# Patient Record
Sex: MALE | Race: WHITE | HISPANIC OR LATINO
[De-identification: names, ages, dates, MRNs, and addresses within clinical notes are randomized per-mention and may not be internally consistent; named-entity substitution may affect disease eponyms.]

---

## 2019-03-05 PROBLEM — Z00.00 ENCOUNTER FOR PREVENTIVE HEALTH EXAMINATION: Status: ACTIVE | Noted: 2019-03-05

## 2019-03-12 ENCOUNTER — APPOINTMENT (OUTPATIENT)
Dept: CARDIOLOGY | Facility: CLINIC | Age: 49
End: 2019-03-12

## 2019-03-12 VITALS
HEIGHT: 63 IN | WEIGHT: 137 LBS | OXYGEN SATURATION: 98 % | HEART RATE: 68 BPM | DIASTOLIC BLOOD PRESSURE: 62 MMHG | BODY MASS INDEX: 24.27 KG/M2 | SYSTOLIC BLOOD PRESSURE: 108 MMHG

## 2019-03-12 VITALS — SYSTOLIC BLOOD PRESSURE: 104 MMHG | DIASTOLIC BLOOD PRESSURE: 64 MMHG

## 2019-04-08 ENCOUNTER — APPOINTMENT (OUTPATIENT)
Dept: CARDIOLOGY | Facility: CLINIC | Age: 49
End: 2019-04-08
Payer: COMMERCIAL

## 2019-04-08 ENCOUNTER — NON-APPOINTMENT (OUTPATIENT)
Age: 49
End: 2019-04-08

## 2019-04-08 VITALS
WEIGHT: 140 LBS | DIASTOLIC BLOOD PRESSURE: 70 MMHG | OXYGEN SATURATION: 97 % | HEART RATE: 65 BPM | HEIGHT: 60 IN | BODY MASS INDEX: 27.48 KG/M2 | SYSTOLIC BLOOD PRESSURE: 112 MMHG

## 2019-04-08 VITALS — DIASTOLIC BLOOD PRESSURE: 66 MMHG | BODY MASS INDEX: 26.45 KG/M2 | HEIGHT: 61 IN | SYSTOLIC BLOOD PRESSURE: 108 MMHG

## 2019-04-08 DIAGNOSIS — E05.90 THYROTOXICOSIS, UNSPECIFIED W/OUT THYROTOXIC CRISIS OR STORM: ICD-10-CM

## 2019-04-08 DIAGNOSIS — E78.5 HYPERLIPIDEMIA, UNSPECIFIED: ICD-10-CM

## 2019-04-08 DIAGNOSIS — Z78.9 OTHER SPECIFIED HEALTH STATUS: ICD-10-CM

## 2019-04-08 DIAGNOSIS — I60.9 NONTRAUMATIC SUBARACHNOID HEMORRHAGE, UNSPECIFIED: ICD-10-CM

## 2019-04-08 PROCEDURE — 93000 ELECTROCARDIOGRAM COMPLETE: CPT

## 2019-04-08 PROCEDURE — 99203 OFFICE O/P NEW LOW 30 MIN: CPT

## 2019-04-08 RX ORDER — ATORVASTATIN CALCIUM 10 MG/1
10 TABLET, FILM COATED ORAL
Refills: 0 | Status: ACTIVE | COMMUNITY

## 2019-04-08 RX ORDER — LEVOTHYROXINE SODIUM 75 UG/1
75 TABLET ORAL
Refills: 0 | Status: ACTIVE | COMMUNITY

## 2019-04-08 RX ORDER — CHOLECALCIFEROL (VITAMIN D3) 125 MCG
5000 CAPSULE ORAL
Refills: 0 | Status: ACTIVE | COMMUNITY

## 2019-05-06 ENCOUNTER — OUTPATIENT (OUTPATIENT)
Dept: OUTPATIENT SERVICES | Facility: HOSPITAL | Age: 49
LOS: 1 days | End: 2019-05-06
Payer: COMMERCIAL

## 2019-05-06 PROCEDURE — 33286 RMVL SUBQ CAR RHYTHM MNTR: CPT

## 2019-05-14 ENCOUNTER — APPOINTMENT (OUTPATIENT)
Dept: CARDIOLOGY | Facility: CLINIC | Age: 49
End: 2019-05-14

## 2021-11-26 ENCOUNTER — EMERGENCY (EMERGENCY)
Facility: HOSPITAL | Age: 51
LOS: 1 days | End: 2021-11-26
Admitting: EMERGENCY MEDICINE
Payer: COMMERCIAL

## 2021-11-26 ENCOUNTER — INPATIENT (INPATIENT)
Facility: HOSPITAL | Age: 51
LOS: 6 days | Discharge: HOME | End: 2021-12-03
Attending: INTERNAL MEDICINE | Admitting: INTERNAL MEDICINE
Payer: COMMERCIAL

## 2021-11-26 VITALS
RESPIRATION RATE: 17 BRPM | OXYGEN SATURATION: 100 % | SYSTOLIC BLOOD PRESSURE: 132 MMHG | TEMPERATURE: 99 F | DIASTOLIC BLOOD PRESSURE: 82 MMHG | HEART RATE: 66 BPM

## 2021-11-26 PROCEDURE — 70487 CT MAXILLOFACIAL W/DYE: CPT | Mod: 26

## 2021-11-26 PROCEDURE — 99284 EMERGENCY DEPT VISIT MOD MDM: CPT

## 2021-11-26 PROCEDURE — 99285 EMERGENCY DEPT VISIT HI MDM: CPT

## 2021-11-26 NOTE — ED PROVIDER NOTE - ATTENDING CONTRIBUTION TO CARE
I performed a face-to-face evaluation of the patient and performed a history and physical examination. I agree with the history and physical examination.    Musa: Transfer from [facility] for left anterior mandibular collection to see OMFS or dental. No fevers. This has progressed over about two months and has failed amoxicillin. Will call OMFS. May need to repeat CT scan because we are unable to visualize prior imaging from today. I performed a face-to-face evaluation of the patient and performed a history and physical examination. I agree with the history and physical examination.    Musa: Transfer from [facility] for left anterior mandibular collection to see OMFS or dental. No fevers. This has progressed over about two months and has failed amoxicillin. Unclear if this is an abscess, since it's not red/hot/particularly tender. Will call OMFS. May need to repeat CT scan because we are unable to visualize prior imaging from today.

## 2021-11-26 NOTE — ED ADULT TRIAGE NOTE - CHIEF COMPLAINT QUOTE
pt presents to ED as a transfer from [facility] ER for OMFS consult secondary to left mandibular swelling x 2 weeks ago that is progressively worsening. pt arrives alert in nad, 20g IV to L AC. Given given clindamycin IVPB at sending facility.

## 2021-11-26 NOTE — ED PROVIDER NOTE - PHYSICAL EXAMINATION
HEENT: L sided extraoral swelling. No tongue elevation or trismus. Uvula is midline and rises symmetrically with phonation.

## 2021-11-26 NOTE — ED PROVIDER NOTE - CLINICAL SUMMARY MEDICAL DECISION MAKING FREE TEXT BOX
Musa: Transfer from [facility] for left anterior mandibular collection to see OMFS or dental. No fevers. This has progressed over about two months and has failed amoxicillin. Will call OMFS. May need to repeat CT scan because we are unable to visualize prior imaging from today. Musa: Transfer from [facility] for left anterior mandibular collection to see OMFS or dental. No fevers. This has progressed over about two months and has failed amoxicillin. Unclear if this is an abscess, since it's not red/hot/particularly tender. Will call OMFS. May need to repeat CT scan because we are unable to visualize prior imaging from today.

## 2021-11-26 NOTE — ED PROVIDER NOTE - CARE PLAN
Detail Level: Detailed Depth Of Biopsy: dermis Principal Discharge DX:	Dental abscess   Was A Bandage Applied: Yes Size Of Lesion In Cm: 0 Biopsy Type: H and E 1 Biopsy Method: Dermablade Anesthesia Type: 1% lidocaine with epinephrine Anesthesia Volume In Cc (Will Not Render If 0): 0.5 Hemostasis: Aluminum Chloride and Electrocautery Wound Care: Vaseline Dressing: bandage Destruction After The Procedure: No Type Of Destruction Used: Curettage Lab: 6 Lab Facility: 3 Billing Type: Third-Party Bill Information: Selecting Yes will display possible errors in your note based on the variables you have selected. This validation is only offered as a suggestion for you. PLEASE NOTE THAT THE VALIDATION TEXT WILL BE REMOVED WHEN YOU FINALIZE YOUR NOTE. IF YOU WANT TO FAX A PRELIMINARY NOTE YOU WILL NEED TO TOGGLE THIS TO 'NO' IF YOU DO NOT WANT IT IN YOUR FAXED NOTE. Principal Discharge DX:	Mandibular swelling

## 2021-11-26 NOTE — ED PROVIDER NOTE - OBJECTIVE STATEMENT
52 Y/O M PMH Hypothyroidism, Hyperlipidemia states that he has been having L sided extraoral pain for the past 2 months. Pt states he went to [facility]. Pt states he did not have any intervention performed there. Pt states he was given Amoxicillin by his PMD which he has been taking without improvement. Pt denies any other sx or acute complaints. [facility], was given Clindamycin and had a CT performed, pt was transferred to [facility] for OMFS eval. 50 Y/O M PMH Hypothyroidism, Hyperlipidemia states that he has been having L sided extraoral pain for the past 2 months near a site at the anterior mandible where he had a remote implant. Pt states he went to [facility]. Pt states he did not have any intervention performed there. Pt states he was given Amoxicillin by his PMD which he has been taking without improvement. Pt denies any other sx or acute complaints. Pt was seen at [facility], was given Clindamycin and had a CT performed, pt was transferred to [facility] for OMFS eval.

## 2021-11-26 NOTE — ED PROVIDER NOTE - PROGRESS NOTE DETAILS
NEGRO Liriano: Discussed with OMFS, requests to discuss case with dental first, discussed case with dental, awaiting eval. Musa: Seen by Dental. CDU for MRI, as this is not clearly an abscess. May be a tumor.

## 2021-11-27 DIAGNOSIS — E78.5 HYPERLIPIDEMIA, UNSPECIFIED: ICD-10-CM

## 2021-11-27 DIAGNOSIS — L81.9 DISORDER OF PIGMENTATION, UNSPECIFIED: ICD-10-CM

## 2021-11-27 DIAGNOSIS — Z29.9 ENCOUNTER FOR PROPHYLACTIC MEASURES, UNSPECIFIED: ICD-10-CM

## 2021-11-27 DIAGNOSIS — R22.0 LOCALIZED SWELLING, MASS AND LUMP, HEAD: ICD-10-CM

## 2021-11-27 DIAGNOSIS — E03.9 HYPOTHYROIDISM, UNSPECIFIED: ICD-10-CM

## 2021-11-27 LAB
A1C WITH ESTIMATED AVERAGE GLUCOSE RESULT: 5.6 % — SIGNIFICANT CHANGE UP (ref 4–5.6)
A1C WITH ESTIMATED AVERAGE GLUCOSE RESULT: 5.7 % — HIGH (ref 4–5.6)
ALBUMIN SERPL ELPH-MCNC: 4.2 G/DL — SIGNIFICANT CHANGE UP (ref 3.3–5)
ALBUMIN SERPL ELPH-MCNC: 4.3 G/DL — SIGNIFICANT CHANGE UP (ref 3.3–5)
ALP SERPL-CCNC: 105 U/L — SIGNIFICANT CHANGE UP (ref 40–120)
ALP SERPL-CCNC: 107 U/L — SIGNIFICANT CHANGE UP (ref 40–120)
ALT FLD-CCNC: 20 U/L — SIGNIFICANT CHANGE UP (ref 4–41)
ALT FLD-CCNC: 22 U/L — SIGNIFICANT CHANGE UP (ref 4–41)
ANION GAP SERPL CALC-SCNC: 10 MMOL/L — SIGNIFICANT CHANGE UP (ref 7–14)
ANION GAP SERPL CALC-SCNC: 12 MMOL/L — SIGNIFICANT CHANGE UP (ref 7–14)
AST SERPL-CCNC: 18 U/L — SIGNIFICANT CHANGE UP (ref 4–40)
AST SERPL-CCNC: 20 U/L — SIGNIFICANT CHANGE UP (ref 4–40)
B PERT DNA SPEC QL NAA+PROBE: SIGNIFICANT CHANGE UP
B PERT+PARAPERT DNA PNL SPEC NAA+PROBE: SIGNIFICANT CHANGE UP
BASOPHILS # BLD AUTO: 0.02 K/UL — SIGNIFICANT CHANGE UP (ref 0–0.2)
BASOPHILS NFR BLD AUTO: 0.3 % — SIGNIFICANT CHANGE UP (ref 0–2)
BILIRUB SERPL-MCNC: 0.8 MG/DL — SIGNIFICANT CHANGE UP (ref 0.2–1.2)
BILIRUB SERPL-MCNC: 0.8 MG/DL — SIGNIFICANT CHANGE UP (ref 0.2–1.2)
BORDETELLA PARAPERTUSSIS (RAPRVP): SIGNIFICANT CHANGE UP
BUN SERPL-MCNC: 18 MG/DL — SIGNIFICANT CHANGE UP (ref 7–23)
BUN SERPL-MCNC: 18 MG/DL — SIGNIFICANT CHANGE UP (ref 7–23)
C PNEUM DNA SPEC QL NAA+PROBE: SIGNIFICANT CHANGE UP
CALCIUM SERPL-MCNC: 9 MG/DL — SIGNIFICANT CHANGE UP (ref 8.4–10.5)
CALCIUM SERPL-MCNC: 9.3 MG/DL — SIGNIFICANT CHANGE UP (ref 8.4–10.5)
CHLORIDE SERPL-SCNC: 101 MMOL/L — SIGNIFICANT CHANGE UP (ref 98–107)
CHLORIDE SERPL-SCNC: 103 MMOL/L — SIGNIFICANT CHANGE UP (ref 98–107)
CO2 SERPL-SCNC: 24 MMOL/L — SIGNIFICANT CHANGE UP (ref 22–31)
CO2 SERPL-SCNC: 26 MMOL/L — SIGNIFICANT CHANGE UP (ref 22–31)
CREAT SERPL-MCNC: 0.86 MG/DL — SIGNIFICANT CHANGE UP (ref 0.5–1.3)
CREAT SERPL-MCNC: 0.88 MG/DL — SIGNIFICANT CHANGE UP (ref 0.5–1.3)
EOSINOPHIL # BLD AUTO: 0.12 K/UL — SIGNIFICANT CHANGE UP (ref 0–0.5)
EOSINOPHIL NFR BLD AUTO: 1.8 % — SIGNIFICANT CHANGE UP (ref 0–6)
ESTIMATED AVERAGE GLUCOSE: 114 — SIGNIFICANT CHANGE UP
ESTIMATED AVERAGE GLUCOSE: 117 — SIGNIFICANT CHANGE UP
FLUAV SUBTYP SPEC NAA+PROBE: SIGNIFICANT CHANGE UP
FLUBV RNA SPEC QL NAA+PROBE: SIGNIFICANT CHANGE UP
GLUCOSE SERPL-MCNC: 100 MG/DL — HIGH (ref 70–99)
GLUCOSE SERPL-MCNC: 93 MG/DL — SIGNIFICANT CHANGE UP (ref 70–99)
HADV DNA SPEC QL NAA+PROBE: SIGNIFICANT CHANGE UP
HCOV 229E RNA SPEC QL NAA+PROBE: SIGNIFICANT CHANGE UP
HCOV HKU1 RNA SPEC QL NAA+PROBE: SIGNIFICANT CHANGE UP
HCOV NL63 RNA SPEC QL NAA+PROBE: SIGNIFICANT CHANGE UP
HCOV OC43 RNA SPEC QL NAA+PROBE: SIGNIFICANT CHANGE UP
HCT VFR BLD CALC: 42.8 % — SIGNIFICANT CHANGE UP (ref 39–50)
HCT VFR BLD CALC: 43.5 % — SIGNIFICANT CHANGE UP (ref 39–50)
HGB BLD-MCNC: 14.5 G/DL — SIGNIFICANT CHANGE UP (ref 13–17)
HGB BLD-MCNC: 14.5 G/DL — SIGNIFICANT CHANGE UP (ref 13–17)
HMPV RNA SPEC QL NAA+PROBE: SIGNIFICANT CHANGE UP
HPIV1 RNA SPEC QL NAA+PROBE: SIGNIFICANT CHANGE UP
HPIV2 RNA SPEC QL NAA+PROBE: SIGNIFICANT CHANGE UP
HPIV3 RNA SPEC QL NAA+PROBE: SIGNIFICANT CHANGE UP
HPIV4 RNA SPEC QL NAA+PROBE: SIGNIFICANT CHANGE UP
IANC: 4.32 K/UL — SIGNIFICANT CHANGE UP (ref 1.5–8.5)
IMM GRANULOCYTES NFR BLD AUTO: 0.2 % — SIGNIFICANT CHANGE UP (ref 0–1.5)
LYMPHOCYTES # BLD AUTO: 1.5 K/UL — SIGNIFICANT CHANGE UP (ref 1–3.3)
LYMPHOCYTES # BLD AUTO: 22.7 % — SIGNIFICANT CHANGE UP (ref 13–44)
M PNEUMO DNA SPEC QL NAA+PROBE: SIGNIFICANT CHANGE UP
MCHC RBC-ENTMCNC: 30.1 PG — SIGNIFICANT CHANGE UP (ref 27–34)
MCHC RBC-ENTMCNC: 30.8 PG — SIGNIFICANT CHANGE UP (ref 27–34)
MCHC RBC-ENTMCNC: 33.3 GM/DL — SIGNIFICANT CHANGE UP (ref 32–36)
MCHC RBC-ENTMCNC: 33.9 GM/DL — SIGNIFICANT CHANGE UP (ref 32–36)
MCV RBC AUTO: 90.4 FL — SIGNIFICANT CHANGE UP (ref 80–100)
MCV RBC AUTO: 90.9 FL — SIGNIFICANT CHANGE UP (ref 80–100)
MONOCYTES # BLD AUTO: 0.63 K/UL — SIGNIFICANT CHANGE UP (ref 0–0.9)
MONOCYTES NFR BLD AUTO: 9.5 % — SIGNIFICANT CHANGE UP (ref 2–14)
NEUTROPHILS # BLD AUTO: 4.32 K/UL — SIGNIFICANT CHANGE UP (ref 1.8–7.4)
NEUTROPHILS NFR BLD AUTO: 65.5 % — SIGNIFICANT CHANGE UP (ref 43–77)
NIGHT BLUE STAIN TISS: SIGNIFICANT CHANGE UP
NRBC # BLD: 0 /100 WBCS — SIGNIFICANT CHANGE UP
NRBC # BLD: 0 /100 WBCS — SIGNIFICANT CHANGE UP
NRBC # FLD: 0 K/UL — SIGNIFICANT CHANGE UP
NRBC # FLD: 0 K/UL — SIGNIFICANT CHANGE UP
PLATELET # BLD AUTO: 210 K/UL — SIGNIFICANT CHANGE UP (ref 150–400)
PLATELET # BLD AUTO: 224 K/UL — SIGNIFICANT CHANGE UP (ref 150–400)
POTASSIUM SERPL-MCNC: 3.9 MMOL/L — SIGNIFICANT CHANGE UP (ref 3.5–5.3)
POTASSIUM SERPL-MCNC: 4.1 MMOL/L — SIGNIFICANT CHANGE UP (ref 3.5–5.3)
POTASSIUM SERPL-SCNC: 3.9 MMOL/L — SIGNIFICANT CHANGE UP (ref 3.5–5.3)
POTASSIUM SERPL-SCNC: 4.1 MMOL/L — SIGNIFICANT CHANGE UP (ref 3.5–5.3)
PROT SERPL-MCNC: 6.8 G/DL — SIGNIFICANT CHANGE UP (ref 6–8.3)
PROT SERPL-MCNC: 7.1 G/DL — SIGNIFICANT CHANGE UP (ref 6–8.3)
RAPID RVP RESULT: SIGNIFICANT CHANGE UP
RBC # BLD: 4.71 M/UL — SIGNIFICANT CHANGE UP (ref 4.2–5.8)
RBC # BLD: 4.81 M/UL — SIGNIFICANT CHANGE UP (ref 4.2–5.8)
RBC # FLD: 12.1 % — SIGNIFICANT CHANGE UP (ref 10.3–14.5)
RBC # FLD: 12.3 % — SIGNIFICANT CHANGE UP (ref 10.3–14.5)
RSV RNA SPEC QL NAA+PROBE: SIGNIFICANT CHANGE UP
RV+EV RNA SPEC QL NAA+PROBE: SIGNIFICANT CHANGE UP
SARS-COV-2 RNA SPEC QL NAA+PROBE: SIGNIFICANT CHANGE UP
SODIUM SERPL-SCNC: 137 MMOL/L — SIGNIFICANT CHANGE UP (ref 135–145)
SODIUM SERPL-SCNC: 139 MMOL/L — SIGNIFICANT CHANGE UP (ref 135–145)
SPECIMEN SOURCE: SIGNIFICANT CHANGE UP
WBC # BLD: 6.6 K/UL — SIGNIFICANT CHANGE UP (ref 3.8–10.5)
WBC # BLD: 6.65 K/UL — SIGNIFICANT CHANGE UP (ref 3.8–10.5)
WBC # FLD AUTO: 6.6 K/UL — SIGNIFICANT CHANGE UP (ref 3.8–10.5)
WBC # FLD AUTO: 6.65 K/UL — SIGNIFICANT CHANGE UP (ref 3.8–10.5)

## 2021-11-27 PROCEDURE — 99236 HOSP IP/OBS SAME DATE HI 85: CPT

## 2021-11-27 PROCEDURE — 99223 1ST HOSP IP/OBS HIGH 75: CPT | Mod: GC

## 2021-11-27 RX ORDER — AMPICILLIN SODIUM AND SULBACTAM SODIUM 250; 125 MG/ML; MG/ML
3 INJECTION, POWDER, FOR SUSPENSION INTRAMUSCULAR; INTRAVENOUS EVERY 6 HOURS
Refills: 0 | Status: DISCONTINUED | OUTPATIENT
Start: 2021-11-27 | End: 2021-12-03

## 2021-11-27 RX ORDER — SODIUM CHLORIDE 9 MG/ML
1000 INJECTION INTRAMUSCULAR; INTRAVENOUS; SUBCUTANEOUS
Refills: 0 | Status: DISCONTINUED | OUTPATIENT
Start: 2021-11-27 | End: 2021-11-27

## 2021-11-27 RX ORDER — ACETAMINOPHEN 500 MG
975 TABLET ORAL EVERY 6 HOURS
Refills: 0 | Status: DISCONTINUED | OUTPATIENT
Start: 2021-11-27 | End: 2021-11-30

## 2021-11-27 RX ORDER — VANCOMYCIN HCL 1 G
1000 VIAL (EA) INTRAVENOUS ONCE
Refills: 0 | Status: COMPLETED | OUTPATIENT
Start: 2021-11-27 | End: 2021-11-27

## 2021-11-27 RX ORDER — LEVOTHYROXINE SODIUM 125 MCG
1 TABLET ORAL
Qty: 0 | Refills: 0 | DISCHARGE

## 2021-11-27 RX ORDER — VANCOMYCIN HCL 1 G
750 VIAL (EA) INTRAVENOUS EVERY 12 HOURS
Refills: 0 | Status: DISCONTINUED | OUTPATIENT
Start: 2021-11-27 | End: 2021-11-29

## 2021-11-27 RX ORDER — AMPICILLIN SODIUM AND SULBACTAM SODIUM 250; 125 MG/ML; MG/ML
3 INJECTION, POWDER, FOR SUSPENSION INTRAMUSCULAR; INTRAVENOUS ONCE
Refills: 0 | Status: COMPLETED | OUTPATIENT
Start: 2021-11-27 | End: 2021-11-27

## 2021-11-27 RX ORDER — ATORVASTATIN CALCIUM 80 MG/1
1 TABLET, FILM COATED ORAL
Qty: 0 | Refills: 0 | DISCHARGE

## 2021-11-27 RX ORDER — ATORVASTATIN CALCIUM 80 MG/1
10 TABLET, FILM COATED ORAL AT BEDTIME
Refills: 0 | Status: DISCONTINUED | OUTPATIENT
Start: 2021-11-27 | End: 2021-12-03

## 2021-11-27 RX ORDER — LEVOTHYROXINE SODIUM 125 MCG
75 TABLET ORAL DAILY
Refills: 0 | Status: DISCONTINUED | OUTPATIENT
Start: 2021-11-27 | End: 2021-12-03

## 2021-11-27 RX ORDER — KETOROLAC TROMETHAMINE 30 MG/ML
30 SYRINGE (ML) INJECTION EVERY 6 HOURS
Refills: 0 | Status: DISCONTINUED | OUTPATIENT
Start: 2021-11-27 | End: 2021-11-27

## 2021-11-27 RX ADMIN — Medication 975 MILLIGRAM(S): at 23:15

## 2021-11-27 RX ADMIN — Medication 975 MILLIGRAM(S): at 22:24

## 2021-11-27 RX ADMIN — AMPICILLIN SODIUM AND SULBACTAM SODIUM 200 GRAM(S): 250; 125 INJECTION, POWDER, FOR SUSPENSION INTRAMUSCULAR; INTRAVENOUS at 14:01

## 2021-11-27 RX ADMIN — Medication 250 MILLIGRAM(S): at 17:25

## 2021-11-27 RX ADMIN — AMPICILLIN SODIUM AND SULBACTAM SODIUM 200 GRAM(S): 250; 125 INJECTION, POWDER, FOR SUSPENSION INTRAMUSCULAR; INTRAVENOUS at 20:08

## 2021-11-27 RX ADMIN — SODIUM CHLORIDE 125 MILLILITER(S): 9 INJECTION INTRAMUSCULAR; INTRAVENOUS; SUBCUTANEOUS at 20:09

## 2021-11-27 RX ADMIN — AMPICILLIN SODIUM AND SULBACTAM SODIUM 200 GRAM(S): 250; 125 INJECTION, POWDER, FOR SUSPENSION INTRAMUSCULAR; INTRAVENOUS at 02:33

## 2021-11-27 RX ADMIN — SODIUM CHLORIDE 125 MILLILITER(S): 9 INJECTION INTRAMUSCULAR; INTRAVENOUS; SUBCUTANEOUS at 04:43

## 2021-11-27 RX ADMIN — AMPICILLIN SODIUM AND SULBACTAM SODIUM 200 GRAM(S): 250; 125 INJECTION, POWDER, FOR SUSPENSION INTRAMUSCULAR; INTRAVENOUS at 07:48

## 2021-11-27 RX ADMIN — ATORVASTATIN CALCIUM 10 MILLIGRAM(S): 80 TABLET, FILM COATED ORAL at 22:25

## 2021-11-27 RX ADMIN — Medication 30 MILLIGRAM(S): at 04:39

## 2021-11-27 NOTE — H&P ADULT - NSHPREVIEWOFSYSTEMS_GEN_ALL_CORE
GEN: No fever, chills, night sweats, weight loss  EYES: No vision changes, irritation, itchiness  ENT: No ear pain, congestion, sore throat  RESP: No cough or trouble breathing  CARDIOVASCULAR: No chest pain or palpitations  GI: No nausea/vomiting, diarrhea, constipation  :  No change in urine output; no dysuria, hematuria, or discharge  MSK: No joint or muscle pain  SKIN: No rashes  NEURO: No headache; no abnormal movements; no numbness or tingling  Remainder negative, except as per the HPI GEN: No fever, chills, night sweats, weight loss  EYES: No vision changes, irritation, itchiness  ENT: No ear pain, congestion, sore throat  RESP: No cough or trouble breathing  CARDIOVASCULAR: No chest pain or palpitations  GI: No nausea/vomiting, diarrhea, constipation  :  No change in urine output; no dysuria, hematuria, or discharge  MSK: +intermittent joint pain and swelling in hands, mostly at work  SKIN: No rashes  NEURO: No headache; no abnormal movements; no numbness or tingling  Remainder negative, except as per the HPI

## 2021-11-27 NOTE — H&P ADULT - NSHPPHYSICALEXAM_GEN_ALL_CORE
GEN: Awake, AOx3, NAD.  HEENT: NCAT  ---EYES: no scleral icterus, EOMI, PERRLA  CARDIO: RRR. Normal S1/S2, no m/r/g. No JVD.  RESP: CTAB  ABD: Soft, NTND. BS+. No masses, no hepatosplenomegaly.  : No CVAT.   MSK: No obvious deformity or ROM deficit. 2+ pulses x4. No edema.  SKIN: Warm, dry. No rashes. Nail beds without cyanosis or clubbing.  NEURO: Moves all four extremities spontaneously  PSYCH: Appropriate mood & affect. No VH/AH. No SI/HI. GEN: Awake, AOx3, NAD.  HEENT: NCAT  ---EYES: no scleral icterus, EOMI, PERRLA  ---MOUTH: Mild-Moderately poor dentition without obvious caries or fillings; palpable swelling on buccal mucosa underlying more superficial mass described below  ---FACE: ~8cm x 3cm fusiform, erythematous, non-blanching, firm mass w/o fluctuance but with mild TTP  CARDIO: RRR. Normal S1/S2, no m/r/g.   RESP: CTAB, no w/r/r  ABD: Soft, NTND. BS+. No masses, no hepatosplenomegaly.  : No CVAT.   MSK: No obvious deformity or ROM deficit. 2+ pulses x4. No edema.  SKIN: Warm, dry. No rashes. Nail beds without cyanosis or clubbing.  NEURO: Moves all four extremities spontaneously  PSYCH: Appropriate mood & affect.

## 2021-11-27 NOTE — H&P ADULT - PROBLEM SELECTOR PLAN 2
H/O Hypothyroidism. Will check TSH or collect collateral from prescriber  - C/W Home Levothyroxine 75ug

## 2021-11-27 NOTE — ED CDU PROVIDER INITIAL DAY NOTE - MEDICAL DECISION MAKING DETAILS
Musa: CDU for MRI of mandible to help delineate infection vs. possible tumor or delayed complication of implant.

## 2021-11-27 NOTE — H&P ADULT - PROBLEM SELECTOR PLAN 5
Hospital Bundle  Fluids: PO ad margareth  Electrolytes: Replete K > 4, Mg > 2, Phos > 3  Nutrition: Diet DASH (NPO @ [place]?)  PPX  ---VTE: SCDs, ambulate  ---GI: N/A  ---Resp: N/A  Access: PIV  Code Status: FULL CODE  Dispo: Pending surgical management, likely to home when cleared

## 2021-11-27 NOTE — H&P ADULT - NSHPLABSRESULTS_GEN_ALL_CORE
LABS:                         14.5   6.60  )-----------( 210      ( 27 Nov 2021 06:51 )             42.8     11-27    139  |  103  |  18  ----------------------------<  93  3.9   |  26  |  0.86    Ca    9.0      27 Nov 2021 06:51    TPro  6.8  /  Alb  4.2  /  TBili  0.8  /  DBili  x   /  AST  18  /  ALT  20  /  AlkPhos  105  11-27      MICROBIOLOGY  Respiratory Viral Panel with COVID-19 by IESHA (11.27.21 @ 02:25)   Rapid RVP Result: NotDete   SARS-CoV-2: NotDete: This Respiratory Panel uses polymerase chain reaction (PCR) to detect for   adenovirus; coronavirus (HKU1, NL63, 229E, OC43); human metapneumovirus   (hMPV); human enterovirus/rhinovirus (Entero/RV); influenza A; influenza   A/H1; influenza A/H3; influenza A/H1-2009; influenza B; parainfluenza   viruses 1, 2, 3, 4; respiratory syncytial virus; Mycoplasma pneumoniae;   Chlamydophila pneumoniae; and SARS-CoV-2.   Adenovirus (RapRVP): NotDetec   Influenza A (RapRVP): NotDetec   Influenza B (RapRVP): NotDetec   Parainfluenza 1 (RapRVP): NotDetec   Parainfluenza 2 (RapRVP): NotDetec   Parainfluenza 3 (RapRVP): NotDetec   Parainfluenza 4 (RapRVP): NotDetec   Resp Syncytial Virus (RapRVP): NotDetec   Bordetella pertussis (RapRVP): NotDetec   Bordetella parapertussis (RapRVP): NotDetec   Chlamydia pneumoniae (RapRVP): NotDetec   Mycoplasma pneumoniae (RapRVP): NotDetec   Entero/Rhinovirus (RapRVP): NotDetec   HKU1 Coronavirus (RapRVP): NotDetec   NL63 Coronavirus (RapRVP): NotDetec   229E Coronavirus (RapRVP): NotDetec   OC43 Coronavirus (RapRVP): [facility]   hMPV (RapRVP): [facility]       IMAGING    CARDIOLOGY LABS:                         14.5   6.60  )-----------( 210      ( 27 Nov 2021 06:51 )             42.8     11-27    139  |  103  |  18  ----------------------------<  93  3.9   |  26  |  0.86    Ca    9.0      27 Nov 2021 06:51    TPro  6.8  /  Alb  4.2  /  TBili  0.8  /  DBili  x   /  AST  18  /  ALT  20  /  AlkPhos  105  11-27      MICROBIOLOGY  Respiratory Viral Panel with COVID-19 by IESHA (11.27.21 @ 02:25)   Rapid RVP Result: NotDete   SARS-CoV-2: NotDete: This Respiratory Panel uses polymerase chain reaction (PCR) to detect for   adenovirus; coronavirus (HKU1, NL63, 229E, OC43); human metapneumovirus   (hMPV); human enterovirus/rhinovirus (Entero/RV); influenza A; influenza   A/H1; influenza A/H3; influenza A/H1-2009; influenza B; parainfluenza   viruses 1, 2, 3, 4; respiratory syncytial virus; Mycoplasma pneumoniae;   Chlamydophila pneumoniae; and SARS-CoV-2.   Adenovirus (RapRVP): NotDetec   Influenza A (RapRVP): NotDetec   Influenza B (RapRVP): NotDetec   Parainfluenza 1 (RapRVP): NotDetec   Parainfluenza 2 (RapRVP): NotDetec   Parainfluenza 3 (RapRVP): NotDetec   Parainfluenza 4 (RapRVP): NotDetec   Resp Syncytial Virus (RapRVP): NotDetec   Bordetella pertussis (RapRVP): NotDetec   Bordetella parapertussis (RapRVP): NotDetec   Chlamydia pneumoniae (RapRVP): NotDetec   Mycoplasma pneumoniae (RapRVP): NotDetec   Entero/Rhinovirus (RapRVP): NotDetec   HKU1 Coronavirus (RapRVP): NotDetec   NL63 Coronavirus (RapRVP): NotDetec   229E Coronavirus (RapRVP): NotDetec   OC43 Coronavirus ([facility]apRVP): [facility]   hMPV (RapRVP): [facility]       IMAGING  Awaiting final report from OSH    CARDIOLOGY  NSR by documentation, will assess when available on floor

## 2021-11-27 NOTE — ED CDU PROVIDER DISPOSITION NOTE - CLINICAL COURSE
51yM w/pmhx hypothyroidism, hyperlipidemia transferred to [facility] with concern for dental infection/implant infection for OMFS. CT showing large fluid collection w/internal foci of air in left buccal space extending to anterior mandible. Pt sent to CDU for IV abx, MRI and OMFS/dental consult. This AM dental requesting plastics consult, unclear if infection is odontogenic in origin. Pt seen by plastics, requesting medicine admission for IV abx, vanc/unasyn, possible procedure. Spoke with radiology attg, concern for chin implant infection possibly 2/2 to left molar infection, addendum to made to original CT read. Pending MRI. Pt admitted to medicine, pt aware of admission

## 2021-11-27 NOTE — ED CDU PROVIDER INITIAL DAY NOTE - ATTENDING CONTRIBUTION TO CARE
I performed a face-to-face evaluation of the patient and performed a history and physical examination. I agree with the history and physical examination.    Musa: CDU for MRI of mandible to help delineate infection vs. possible tumor or delayed complication of implant.

## 2021-11-27 NOTE — ED CDU PROVIDER INITIAL DAY NOTE - PHYSICAL EXAMINATION
Well appearing, well nourished, awake, alert, oriented to person, place, time/situation and in no apparent distress.    Airway patent. Swelling and mild tenderness of ~5 cm mass at L anterior mandible w/ some swelling at the buccal side of the lip.    Eyes without scleral injection. No jaundice.    Strong pulse.    Respirations unlabored.    Abdomen soft, non-tender, no guarding.    Spine appears normal, range of motion is not limited, no muscle or joint tenderness.    Alert and oriented, no gross motor or sensory deficits.    Skin normal color for race, warm, dry and intact. No evidence of rash.    No SI/HI.

## 2021-11-27 NOTE — H&P ADULT - ASSESSMENT
Mr. Fulton is a 51M with h/o Hypothyroidism, HLD Mr. Fulton is a 51M with h/o Hypothyroidism, HLD who is admitted for evaluation and management of a left-sided mandibular mass with imaging findings concerning for abscess (either odontologic or delayed foreign-body in origin) or non-infectious process (i.e. sarcoma vs other connective tissue mass) and who remains hemodynamically stable while receiving IV ABx and awaiting further characterization with MRI.

## 2021-11-27 NOTE — PROGRESS NOTE ADULT - SUBJECTIVE AND OBJECTIVE BOX
· Chief Complaint: The patient is a 51y Male complaining of abscess.  · HPI Objective Statement: 52 Y/O M PMH Hypothyroidism, Hyperlipidemia states that he has been having L sided extraoral pain for the past 2 months. Pt states he went to [facility]. Pt states he did not have any intervention performed there. Pt states he was given Amoxicillin by his PMD which he has been taking without improvement. Pt denies any other sx or acute complaints. Pt was seen at [facility], was given Clindamycin and had a CT performed, pt was transferred to [facility] for OMFS eval.    Patient is a 51y old  Male who presents with a chief complaint of     HPI:      PAST MEDICAL & SURGICAL HISTORY:  Hypothyroid    Hyperlipemia    No significant past surgical history        MEDICATIONS  (STANDING):  ampicillin/sulbactam  IVPB 3 Gram(s) IV Intermittent Once    MEDICATIONS  (PRN):      Allergies    No Known Allergies    Intolerances        FAMILY HISTORY:      *SOCIAL HISTORY: (guardian or who pt came with), (smoking hx)    *Last Dental Visit:    Vital Signs Last 24 Hrs  T(C): 37.3 (26 Nov 2021 20:47), Max: 37.3 (26 Nov 2021 20:47)  T(F): 99.1 (26 Nov 2021 20:47), Max: 99.1 (26 Nov 2021 20:47)  HR: 66 (26 Nov 2021 20:47) (66 - 66)  BP: 132/82 (26 Nov 2021 20:47) (132/82 - 132/82)  BP(mean): --  RR: 17 (26 Nov 2021 20:47) (17 - 17)  SpO2: 100% (26 Nov 2021 20:47) (100% - 100%)    EOE:  TMJ ( -  ) clicks                    ( -   ) pops                    (  -  ) crepitus             Mandible <<FROM>>             Facial bones and MOM <<grossly intact>>             ( -  ) trismus             ( -  ) LAD             ( +  ) swelling left mandible along angle of mandible              ( +  ) asymmetry             (  +  ) palpation             ( -  ) SOB             ( -  ) dysphagia             ( -  ) LOC    IOE:  permanent dentition            hard/soft palate: wnl           tongue/FOM <<WNL>>           labial/buccal mucosa <<WNL>>           ( -  ) percussion           ( +  ) palpation buccal vestibule            ( +  ) swelling buccal vestibule from #17-#23   Mobility: (-)       LABS:                *DENTAL RADIOGRAPHS: PAN taken and reviewed    RADIOLOGY & ADDITIONAL STUDIES: CT SCan from other hospital - Recommended MRI scan with IV contrast    ASSESSMENT: Unable to identify odontogenic source at this time.     PROCEDURE:  Verbal <<and written>> consent given. Discussed with patient findings. Administered left HELENA Block with 1 carpule of 2% lidocaine 1/100k epi. Consulted OMFS. Recommended MRI w/ contrast and IV unasyn.     RECOMMENDATIONS:  1) MRI with IV Contrast and start IV Unasyn  2) Follow up with OMFS after MRI   3) Dental F/U with outpatient dentist for comprehensive dental care.   4) If any difficulty swallowing/breathing, fever occur, page dental.     Jonathan Rizzo DDS #[id number]  · Chief Complaint: The patient is a 51y Male complaining of abscess.  · HPI Objective Statement: 50 Y/O M PMH Hypothyroidism, Hyperlipidemia states that he has been having L sided extraoral pain for the past 2 months. Pt states he went to [facility]. Pt states he did not have any intervention performed there. Pt states he was given Amoxicillin by his PMD which he has been taking without improvement. Pt denies any other sx or acute complaints. Pt was seen at [facility], was given Clindamycin and had a CT performed, pt was transferred to [facility] for OMFS eval.    Patient is a 51y old  Male who presents with a chief complaint of     HPI:      PAST MEDICAL & SURGICAL HISTORY:  Hypothyroid    Hyperlipemia    No significant past surgical history        MEDICATIONS  (STANDING):  ampicillin/sulbactam  IVPB 3 Gram(s) IV Intermittent Once    MEDICATIONS  (PRN):      Allergies    No Known Allergies    Intolerances        FAMILY HISTORY:      *SOCIAL HISTORY: (guardian or who pt came with), (smoking hx)    *Last Dental Visit:    Vital Signs Last 24 Hrs  T(C): 37.3 (26 Nov 2021 20:47), Max: 37.3 (26 Nov 2021 20:47)  T(F): 99.1 (26 Nov 2021 20:47), Max: 99.1 (26 Nov 2021 20:47)  HR: 66 (26 Nov 2021 20:47) (66 - 66)  BP: 132/82 (26 Nov 2021 20:47) (132/82 - 132/82)  BP(mean): --  RR: 17 (26 Nov 2021 20:47) (17 - 17)  SpO2: 100% (26 Nov 2021 20:47) (100% - 100%)    EOE:  TMJ ( -  ) clicks                    ( -   ) pops                    (  -  ) crepitus             Mandible <<FROM>>             Facial bones and MOM <<grossly intact>>             ( -  ) trismus             ( -  ) LAD             ( +  ) swelling left mandible along angle of mandible              ( +  ) asymmetry             (  +  ) palpation             ( -  ) SOB             ( -  ) dysphagia             ( -  ) LOC    IOE:  permanent dentition            hard/soft palate: wnl           tongue/FOM <<WNL>>           labial/buccal mucosa <<WNL>>           ( -  ) percussion - all teeth tested, no percussion sensitivity.           ( + ) probing depths WNL           ( + ) #17 unerupted            ( +  ) palpation buccal vestibule            ( +  ) swelling buccal vestibule from #17-#23           ( +) Vestibular swelling that is spontaneously draining lower left.   Mobility: (-)       LABS:                *DENTAL RADIOGRAPHS: PAN taken and reviewed    RADIOLOGY & ADDITIONAL STUDIES: CT SCan from other hospital - Recommended MRI scan with IV contrast    ASSESSMENT: No Odontogenic origin for abscess.   Pt. had plastic implant placed L mandibular region by plastic surgeon, Dr. Nghia Del Cid of [facility] plastic surgery 16 years ago that was never removed - as per pt.     PROCEDURE:  Verbal <<and written>> consent given. Discussed with patient findings. Administered left HELENA Block with 1 carpule of 2% lidocaine 1/100k epi. No Odontogenic source of infection observed. Recommended plastics consultation     RECOMMENDATIONS:  1) Plastic surg consult for further eval and treatment.   2) Dental F/U with outpatient dentist for comprehensive dental care.   3) If any difficulty swallowing/breathing, fever occur, page dental.     Jonathan Rizzo DDS #[id number]

## 2021-11-27 NOTE — H&P ADULT - PROBLEM SELECTOR PLAN 4
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 Upon review, could be acrofacial vitiligo or "lip tip" vitiligo; also curious about chemical leukoderma (d/t work as ). Overall, not likely to require aggressive management at this time, but may benefit from Derm C/S during the week to r/o more sinister etiologies as it seems pt may not have great access to care.  - CTM  - Consider Derm C/S if still here on Monday

## 2021-11-27 NOTE — H&P ADULT - PROBLEM SELECTOR PLAN 1
Thorough documentation of subjective included above. DDx includes infectious vs non-infectious swelling, including or excluding the implant he's reported to have had placed. Cultures of reported aspirate have been sent (though documentation of who collect or where the sample came from is as yet unclear).  Diagnostics  - Plastics C/S, Appreciate Recs  - OMFS/Dental C/S, Appreciate Recs  [ ] CT Max Face: reports that will be over-read or updated by [facility] radiologist Monday  [ ] MR Facial Bones c Contrast    Therapeutics  - Pain Control: APAP 975mg PO Q6H PRN  - C/W Ampicillin/Sulbactam ("Unasyn") 3g IV Q6H (11/27-)  - C/W Vancomycin 750mg IV Q12H (11/27-)

## 2021-11-27 NOTE — ED CDU PROVIDER INITIAL DAY NOTE - OBJECTIVE STATEMENT
Musa: Hypothyroidism, Hyperlipidemia. L-sided extraoral pain x 2 months near a site at the anterior mandible where he had an implant. Failed Amoxicillin by PMD. Went to [facility]: got 1 dose of ABx there. Pt states he did not have any intervention performed there. Pt denies any other sx or acute complaints. Pt was seen at [facility], was given Clindamycin and had a CT performed, pt was transferred to [facility] for OMFS eval. OMFS deferred to Dental. Dental says this is not entirely c/w abscess and requests MRI. Musa: Hypothyroidism, Hyperlipidemia. L-sided extraoral pain x 2 months near a site at the anterior mandible where he had a remote implant. Failed Amoxicillin by PMD. Went to [facility]: got 1 dose of ABx there. Pt states he did not have any intervention performed there. Pt denies any other sx or acute complaints. Pt was seen at [facility], was given Clindamycin and had a CT performed, pt was transferred to [facility] for OMFS eval. OMFS deferred to Dental. Dental says this is not entirely c/w abscess and requests MRI.

## 2021-11-27 NOTE — PATIENT PROFILE ADULT - FLU SEASON?

## 2021-11-27 NOTE — ED CDU PROVIDER INITIAL DAY NOTE - PROGRESS NOTE DETAILS
Patient having some pain will order toradol at this time. Pt otherwise resting comfortably. No drooling, airway intact. +swelling to Lt mandible region with TTP intraorally and externally. Pt pending MRI. Will monitor closely and reassess. Pt signed out to me by NEGRO Grissom pending MRI and OMFS consult today, ordered for IV Unasyn. Pt is resting comfortably at present. OMFS requesting plastics consult, concern infection is from chin implant, spoke with plastics fellow need to consult plastics attg . ENT paged to discuss case Spoke with , [facility] for consult. ENT called, spoke with attg radiologist concern infection is from chin implant. Re-paged . Spoke with , [facility] for consult. ENT called, spoke with attg radiologist concern infection is from chin implant with phelgmon. Re-paged .  requesting admission for IV abx added on vanc. Spoke with  neuro-radiologist [phone], states concern for chin implant infection, possibly secondary to infected molar? with 2/2 superinfection of implant. Discussed this with OMFS/dental. Spoke with hospitalist  who accepts pt. Pt agrees with admission

## 2021-11-27 NOTE — H&P ADULT - HISTORY OF PRESENT ILLNESS
In the ED,  VS:  PEx:  Labs:  Imaging:  ECG:  Micro:  Interventions:  Impression:    In the CDU,       In the ED,  VS: T 99.1 | HR 66 | /82 | RR 17 | O2 100% RA  PEx: L extraoral swelling  Labs: unremarkable  Imaging:   ECG:   Micro:  Interventions:  Impression:    In the CDU,   Mr. Fulton is a 51M with h/o Hypothyroidism, HLD who presents from [facility] as a transfer for further assessment and evaluation of a left-sided, painful, erythematous swelling that he's been developing for several months. About one month after this started, he saw his PCP (Taisha Godoy with [facility]) who prescribed Amoxicillin, which he took for 15 days, which is consistent with his pharmacy fill history. Unfortunately, that didn't resolve the problem, and it began worsening, which lead him to seek care at [facility]. There, they performed a CT Maxillofacial, which demonstrated an obvious left-sided collection concerning for soft tissue abscess. For this he was transferred to [facility] due to concern for either an odontologic process vs an infectious process involving a chin / mandibular implant that he had placed 15-16 years ago to improve reported asymmetric facial flatness.     However, the read for this did not specifically identify any radio-opaque foreign body or material, nor did it comment on the health of his molars. OMFS evaluated the patient in the ED/CDU and reported who performed a thorough evaluation that noted no odontologic process; they recommend a dental evaluation as well. There is also documentation that dental was concerned that the swelling was not clearly an abscess and requested an MRI for further characterization of the mass. Additionally, they recommended a plastic surgery consultation d/t history of mandibular implant. Plastic Surgery evaluated the patient and recommended admission for IV Antibiotics, including Vancomcyin while further evaluation occurs.    Notably, documentation suggests that  (neuro-radiologist [phone]) had concerns for chin implant infection, possibly secondary to infected molar as the primary source for implant infection. OMFS and Plastics reportedly aware as patient is being admitted to medicine.\    Of note, there are additional medications in his pharmacy fill history that raise some concern, including Clindamycin, Methylprednisolone, and Tacrolimus 0.1% ointment (prescribed 10/23/2021), cholecalciferol 50kU QW (last in 01/2021).    In the ED,  VS: T 99.1 | HR 66 | /82 | RR 17 | O2 100% RA  PEx: L extraoral swelling  Labs: unremarkable  Imaging: CT Max Face read assisted by house radiology who would like clarification from the original radiologist regarding certain findings; obvious collection in L mandibular region c/f abscess  ECG: NSR  Micro: Fungal & Abscess Cultures in Specimen Received | AFB Stain w/o organisms  Interventions: Unasyn 3g Q6H x3 | Vancomycin 1g  Impression: L Mandibular Abscess    In the CDU, he is found to be sitting comfortably in his bed. He denies any fevers throughout this saga (even prior). 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

## 2021-11-27 NOTE — H&P ADULT - NSHPSOCIALHISTORY_GEN_ALL_CORE
Lives:    Work/Hobbies:    Tobacco Use:    EtOH Use:    Other Substances:    Sexual History: Lives: in [place] in a house, feels safe at home & well supported    Work/Hobbies: He works in [employer], primarily with concrete / cement    Tobacco Use: Never smoker    EtOH Use: 2-3 beers every 3 months    Other Substances:    Sexual History: Lives: in [place] in a house, feels safe at home & well supported    Work/Hobbies: He works in [employer], primarily with concrete / cement    Tobacco Use: Never smoker    EtOH Use: 2-3 beers every 3 months    Other Substances: None    Sexual History:  Sexually active with women, uses condoms for contraception/protection. Has not been tested for STI's in >1year. Has never tested positive for an STI.

## 2021-11-28 LAB
ANION GAP SERPL CALC-SCNC: 8 MMOL/L — SIGNIFICANT CHANGE UP (ref 7–14)
BASOPHILS # BLD AUTO: 0.02 K/UL — SIGNIFICANT CHANGE UP (ref 0–0.2)
BASOPHILS NFR BLD AUTO: 0.3 % — SIGNIFICANT CHANGE UP (ref 0–2)
BUN SERPL-MCNC: 17 MG/DL — SIGNIFICANT CHANGE UP (ref 7–23)
CALCIUM SERPL-MCNC: 8.5 MG/DL — SIGNIFICANT CHANGE UP (ref 8.4–10.5)
CHLORIDE SERPL-SCNC: 106 MMOL/L — SIGNIFICANT CHANGE UP (ref 98–107)
CO2 SERPL-SCNC: 24 MMOL/L — SIGNIFICANT CHANGE UP (ref 22–31)
COVID-19 NUCLEOCAPSID GAM AB INTERP: POSITIVE
COVID-19 NUCLEOCAPSID TOTAL GAM ANTIBODY RESULT: 7.03 INDEX — HIGH
COVID-19 SPIKE DOMAIN AB INTERP: POSITIVE
COVID-19 SPIKE DOMAIN ANTIBODY RESULT: >250 U/ML — HIGH
CREAT SERPL-MCNC: 0.83 MG/DL — SIGNIFICANT CHANGE UP (ref 0.5–1.3)
EOSINOPHIL # BLD AUTO: 0.23 K/UL — SIGNIFICANT CHANGE UP (ref 0–0.5)
EOSINOPHIL NFR BLD AUTO: 3.7 % — SIGNIFICANT CHANGE UP (ref 0–6)
GLUCOSE SERPL-MCNC: 98 MG/DL — SIGNIFICANT CHANGE UP (ref 70–99)
HCT VFR BLD CALC: 41 % — SIGNIFICANT CHANGE UP (ref 39–50)
HGB BLD-MCNC: 13.9 G/DL — SIGNIFICANT CHANGE UP (ref 13–17)
IANC: 3.98 K/UL — SIGNIFICANT CHANGE UP (ref 1.5–8.5)
IMM GRANULOCYTES NFR BLD AUTO: 0.2 % — SIGNIFICANT CHANGE UP (ref 0–1.5)
LYMPHOCYTES # BLD AUTO: 1.32 K/UL — SIGNIFICANT CHANGE UP (ref 1–3.3)
LYMPHOCYTES # BLD AUTO: 21.4 % — SIGNIFICANT CHANGE UP (ref 13–44)
MAGNESIUM SERPL-MCNC: 2.2 MG/DL — SIGNIFICANT CHANGE UP (ref 1.6–2.6)
MCHC RBC-ENTMCNC: 30.9 PG — SIGNIFICANT CHANGE UP (ref 27–34)
MCHC RBC-ENTMCNC: 33.9 GM/DL — SIGNIFICANT CHANGE UP (ref 32–36)
MCV RBC AUTO: 91.1 FL — SIGNIFICANT CHANGE UP (ref 80–100)
MONOCYTES # BLD AUTO: 0.6 K/UL — SIGNIFICANT CHANGE UP (ref 0–0.9)
MONOCYTES NFR BLD AUTO: 9.7 % — SIGNIFICANT CHANGE UP (ref 2–14)
NEUTROPHILS # BLD AUTO: 3.98 K/UL — SIGNIFICANT CHANGE UP (ref 1.8–7.4)
NEUTROPHILS NFR BLD AUTO: 64.7 % — SIGNIFICANT CHANGE UP (ref 43–77)
NRBC # BLD: 0 /100 WBCS — SIGNIFICANT CHANGE UP
NRBC # FLD: 0 K/UL — SIGNIFICANT CHANGE UP
PHOSPHATE SERPL-MCNC: 3.4 MG/DL — SIGNIFICANT CHANGE UP (ref 2.5–4.5)
PLATELET # BLD AUTO: 218 K/UL — SIGNIFICANT CHANGE UP (ref 150–400)
POTASSIUM SERPL-MCNC: 4.3 MMOL/L — SIGNIFICANT CHANGE UP (ref 3.5–5.3)
POTASSIUM SERPL-SCNC: 4.3 MMOL/L — SIGNIFICANT CHANGE UP (ref 3.5–5.3)
RBC # BLD: 4.5 M/UL — SIGNIFICANT CHANGE UP (ref 4.2–5.8)
RBC # FLD: 12 % — SIGNIFICANT CHANGE UP (ref 10.3–14.5)
SARS-COV-2 IGG+IGM SERPL QL IA: 7.03 INDEX — HIGH
SARS-COV-2 IGG+IGM SERPL QL IA: >250 U/ML — HIGH
SARS-COV-2 IGG+IGM SERPL QL IA: POSITIVE
SARS-COV-2 IGG+IGM SERPL QL IA: POSITIVE
SODIUM SERPL-SCNC: 138 MMOL/L — SIGNIFICANT CHANGE UP (ref 135–145)
WBC # BLD: 6.16 K/UL — SIGNIFICANT CHANGE UP (ref 3.8–10.5)
WBC # FLD AUTO: 6.16 K/UL — SIGNIFICANT CHANGE UP (ref 3.8–10.5)

## 2021-11-28 PROCEDURE — 70542 MRI ORBIT/FACE/NECK W/DYE: CPT | Mod: 26

## 2021-11-28 PROCEDURE — 99233 SBSQ HOSP IP/OBS HIGH 50: CPT | Mod: GC

## 2021-11-28 RX ORDER — OXYCODONE HYDROCHLORIDE 5 MG/1
5 TABLET ORAL ONCE
Refills: 0 | Status: DISCONTINUED | OUTPATIENT
Start: 2021-11-28 | End: 2021-11-28

## 2021-11-28 RX ADMIN — ATORVASTATIN CALCIUM 10 MILLIGRAM(S): 80 TABLET, FILM COATED ORAL at 21:33

## 2021-11-28 RX ADMIN — Medication 250 MILLIGRAM(S): at 18:20

## 2021-11-28 RX ADMIN — Medication 975 MILLIGRAM(S): at 13:06

## 2021-11-28 RX ADMIN — OXYCODONE HYDROCHLORIDE 5 MILLIGRAM(S): 5 TABLET ORAL at 17:57

## 2021-11-28 RX ADMIN — Medication 250 MILLIGRAM(S): at 05:17

## 2021-11-28 RX ADMIN — AMPICILLIN SODIUM AND SULBACTAM SODIUM 200 GRAM(S): 250; 125 INJECTION, POWDER, FOR SUSPENSION INTRAMUSCULAR; INTRAVENOUS at 14:17

## 2021-11-28 RX ADMIN — AMPICILLIN SODIUM AND SULBACTAM SODIUM 200 GRAM(S): 250; 125 INJECTION, POWDER, FOR SUSPENSION INTRAMUSCULAR; INTRAVENOUS at 03:00

## 2021-11-28 RX ADMIN — Medication 975 MILLIGRAM(S): at 14:00

## 2021-11-28 RX ADMIN — Medication 75 MICROGRAM(S): at 05:17

## 2021-11-28 RX ADMIN — OXYCODONE HYDROCHLORIDE 5 MILLIGRAM(S): 5 TABLET ORAL at 18:30

## 2021-11-28 RX ADMIN — AMPICILLIN SODIUM AND SULBACTAM SODIUM 200 GRAM(S): 250; 125 INJECTION, POWDER, FOR SUSPENSION INTRAMUSCULAR; INTRAVENOUS at 19:40

## 2021-11-28 RX ADMIN — AMPICILLIN SODIUM AND SULBACTAM SODIUM 200 GRAM(S): 250; 125 INJECTION, POWDER, FOR SUSPENSION INTRAMUSCULAR; INTRAVENOUS at 07:34

## 2021-11-28 NOTE — PROGRESS NOTE ADULT - ASSESSMENT
Mr. Fulton is a 51M with h/o Hypothyroidism, HLD who is admitted for evaluation and management of a left-sided mandibular mass with imaging findings concerning for abscess (either odontologic or delayed foreign-body in origin) or non-infectious process (i.e. sarcoma vs other connective tissue mass) and who remains hemodynamically stable while receiving IV ABx and awaiting further characterization with MRI.

## 2021-11-28 NOTE — PROGRESS NOTE ADULT - SUBJECTIVE AND OBJECTIVE BOX
CC: 51M patient presents with tooth pain in lower left quad with associated facial and gingival swelling.    HPI:    52 Y/O M PMH Hypothyroidism, Hyperlipidemia states that he has been having L sided extraoral pain for the past 2 months. Pt states he went to [facility]. Pt states he did not have any intervention performed there. Pt states he was given Amoxicillin by his PMD which he has been taking without improvement. Pt denies any other sx or acute complaints. Pt was seen at [facility], was given Clindamycin and had a CT performed, pt was transferred to [facility] for OMFS eval.      Med HX: Localized swelling, mass, and lump of head    Handoff    MEWS Score    Hypothyroid    Hyperlipemia    Dental abscess    Mandibular swelling    Hypothyroid    Hyperlipemia    Hypopigmented skin lesion    Swelling of mandible    Need for prophylactic measure    No significant past surgical history    TX FROM [facility] [email or web address]        Allergies: No Known Allergies      RX:acetaminophen     Tablet .. 975 milliGRAM(s) Oral every 6 hours PRN  ampicillin/sulbactam  IVPB 3 Gram(s) IV Intermittent every 6 hours  atorvastatin 10 milliGRAM(s) Oral at bedtime  levothyroxine 75 MICROGram(s) Oral daily  vancomycin  IVPB 750 milliGRAM(s) IV Intermittent every 12 hours    Bedside exam performed.  EOE: (+) lower left side facial swelling    TMJ WNL  Trismus (-)  LAD (-)  Dysphagia (-)    IOE: Permanent dentition. (+) swelling apical to #18-24. (+) palpation apical to #18-25  Hard/Soft palate WNL  Tongue/Floor of Mouth WNL  Buccal Mucosa WNL  Percussion (-)  Mobility (-)     Radiographs: none taken    Additional Radiograph: CT ordered by ED prior to dental on call arrival.  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    Assessment: Acute apical abscess in lower left quadrant    Treatment: Discussed CT and clinical findings with patient. Discussed RBA of recommended treatment. Obtained verbal consent. Applied 20% benzocaine. Administered 3.5 carpules 4% septocaine 1:100k epi via local infiltration with changing of needles after each administration. Incised to bone, dissected fascial planes, hemopurulence appreciated. Irrigated copiously with sterile saline. Penrose drain placed with 1 3-0 silk suture. Hemostasis achieved. POIG. All questions answered.    Recommendations:   1. OTC pain medication as needed.  2. Per med team, complete course of antibiotics.  3. F/U in 2-3 business days for drain removal with either outpatient dentist or [facility] dental clinic [phone].  4. Follow up with plastics regarding chin implant    Ashley Ricks DDS #[id number]

## 2021-11-28 NOTE — PROGRESS NOTE ADULT - SUBJECTIVE AND OBJECTIVE BOX
Charlie Yun M.D.  [facility] PGY-1    PROGRESS NOTE:     Patient is a 51y old  Male who presents with a chief complaint of Jaw Swelling (27 Nov 2021 16:23)      -OVERNIGHT EVENTS-      -SUBJECTIVE-      MEDICATIONS  (STANDING):  ampicillin/sulbactam  IVPB 3 Gram(s) IV Intermittent every 6 hours  atorvastatin 10 milliGRAM(s) Oral at bedtime  levothyroxine 75 MICROGram(s) Oral daily  vancomycin  IVPB 750 milliGRAM(s) IV Intermittent every 12 hours    MEDICATIONS  (PRN):  acetaminophen     Tablet .. 975 milliGRAM(s) Oral every 6 hours PRN Temp greater or equal to 38C (100.4F), Mild Pain (1 - 3), Moderate Pain (4 - 6), Severe Pain (7 - 10)      -OBJECTIVE-    CAPILLARY BLOOD GLUCOSE        I&O's Summary      VITAL SIGNS  Vital Signs Last 24 Hrs  T(C): 36.7 (28 Nov 2021 05:46), Max: 37.2 (27 Nov 2021 21:23)  T(F): 98 (28 Nov 2021 05:46), Max: 99 (27 Nov 2021 21:23)  HR: 64 (28 Nov 2021 05:46) (64 - 77)  BP: 114/71 (28 Nov 2021 05:46) (114/71 - 121/65)  BP(mean): --  RR: 18 (28 Nov 2021 05:46) (17 - 18)  SpO2: 99% (28 Nov 2021 05:46) (99% - 100%)    PHYSICAL EXAM:  GENERAL/CONSTITUTIONAL: NAD, Awake, AOx3 (person, place, time)  HEENT: NCAT  ---EYES: no scleral icterus, EOMI, PERRLA  ---ENMT: MMM,   ---NECK: No palpable masses; no thyromegaly  CARDIO: RRR. Normal S1/S2, no m/r/g.  RESP: Normal respiratory effort; CTAB, no w/r/r  ABD: Soft, NTND; +BS.   : No CVAT.   MSK: No obvious deformity or ROM deficit.  ---EXTREMITIES: No peripheral edema. Peripheral pulses 2+ x4.  SKIN: Warm, dry. No rashes.   NEURO: Moves all four extremities spontaneously  PSYCH: Appropriate mood & affect. No VH/AH. No SI/HI.    LABS:                        13.9   6.16  )-----------( 218      ( 28 Nov 2021 07:31 )             41.0     11-28    138  |  106  |  17  ----------------------------<  98  4.3   |  24  |  0.83    Ca    8.5      28 Nov 2021 07:31  Phos  3.4     11-28  Mg     2.20     11-28    TPro  6.8  /  Alb  4.2  /  TBili  0.8  /  DBili  x   /  AST  18  /  ALT  20  /  AlkPhos  105  11-27              MICROBIOLOGY    Culture - Acid Fast - Other w/Smear (collected 27 Nov 2021 18:17)  Source: .Other Other, Oral        IMAGING        COORDINATION OF CARE:  ---PCP:  ---Consultants:   Charlie Yun M.D.  [facility] PGY-1    PROGRESS NOTE:     Patient is a 51y old  Male who presents with a chief complaint of Jaw Swelling (27 Nov 2021 16:23)      -OVERNIGHT EVENTS-  NAEON, awiating MRI    -SUBJECTIVE-  Mr. Fulton says that he feels well this morning. he still has pain around the jaw and thinks it's about the same. He denies any chest pain/SOB/abd pain/N/V/fever/chills.    MEDICATIONS  (STANDING):  ampicillin/sulbactam  IVPB 3 Gram(s) IV Intermittent every 6 hours  atorvastatin 10 milliGRAM(s) Oral at bedtime  levothyroxine 75 MICROGram(s) Oral daily  vancomycin  IVPB 750 milliGRAM(s) IV Intermittent every 12 hours    MEDICATIONS  (PRN):  acetaminophen     Tablet .. 975 milliGRAM(s) Oral every 6 hours PRN Temp greater or equal to 38C (100.4F), Mild Pain (1 - 3), Moderate Pain (4 - 6), Severe Pain (7 - 10)      -OBJECTIVE-    CAPILLARY BLOOD GLUCOSE        I&O's Summary      VITAL SIGNS  Vital Signs Last 24 Hrs  T(C): 36.7 (28 Nov 2021 05:46), Max: 37.2 (27 Nov 2021 21:23)  T(F): 98 (28 Nov 2021 05:46), Max: 99 (27 Nov 2021 21:23)  HR: 64 (28 Nov 2021 05:46) (64 - 77)  BP: 114/71 (28 Nov 2021 05:46) (114/71 - 121/65)  BP(mean): --  RR: 18 (28 Nov 2021 05:46) (17 - 18)  SpO2: 99% (28 Nov 2021 05:46) (99% - 100%)    PHYSICAL EXAM:  GENERAL/CONSTITUTIONAL: NAD, Awake, AOx3 (person, place, time)  HEENT: NCAT  ---EYES: no scleral icterus, EOMI, PERRLA  ---ENMT: MMM  CARDIO: RRR.  RESP: Normal respiratory effort  ABD: Soft, NTND  MSK: No obvious deformity or ROM deficit.  ---EXTREMITIES: No peripheral edema. Peripheral pulses 2+ x4.  SKIN: Warm, dry. No rashes.   NEURO: Moves all four extremities spontaneously  PSYCH: Appropriate mood & affect.     LABS:                        13.9   6.16  )-----------( 218      ( 28 Nov 2021 07:31 )             41.0     11-28    138  |  106  |  17  ----------------------------<  98  4.3   |  24  |  0.83    Ca    8.5      28 Nov 2021 07:31  Phos  3.4     11-28  Mg     2.20     11-28    TPro  6.8  /  Alb  4.2  /  TBili  0.8  /  DBili  x   /  AST  18  /  ALT  20  /  AlkPhos  105  11-27    MICROBIOLOGY    Culture - Acid Fast - Other w/Smear (collected 27 Nov 2021 18:17)  Source: .Other Other, Oral    IMAGING  No interval imaging (awaiting MR)      COORDINATION OF CARE:  ---Consultants: Plastics, OMFS

## 2021-11-29 ENCOUNTER — TRANSCRIPTION ENCOUNTER (OUTPATIENT)
Age: 51
End: 2021-11-29

## 2021-11-29 LAB
ANION GAP SERPL CALC-SCNC: 8 MMOL/L — SIGNIFICANT CHANGE UP (ref 7–14)
BASOPHILS # BLD AUTO: 0.03 K/UL — SIGNIFICANT CHANGE UP (ref 0–0.2)
BASOPHILS NFR BLD AUTO: 0.5 % — SIGNIFICANT CHANGE UP (ref 0–2)
BUN SERPL-MCNC: 14 MG/DL — SIGNIFICANT CHANGE UP (ref 7–23)
CALCIUM SERPL-MCNC: 8.9 MG/DL — SIGNIFICANT CHANGE UP (ref 8.4–10.5)
CHLORIDE SERPL-SCNC: 103 MMOL/L — SIGNIFICANT CHANGE UP (ref 98–107)
CO2 SERPL-SCNC: 26 MMOL/L — SIGNIFICANT CHANGE UP (ref 22–31)
CREAT SERPL-MCNC: 0.82 MG/DL — SIGNIFICANT CHANGE UP (ref 0.5–1.3)
CULTURE RESULTS: SIGNIFICANT CHANGE UP
EOSINOPHIL # BLD AUTO: 0.24 K/UL — SIGNIFICANT CHANGE UP (ref 0–0.5)
EOSINOPHIL NFR BLD AUTO: 4.2 % — SIGNIFICANT CHANGE UP (ref 0–6)
GLUCOSE SERPL-MCNC: 110 MG/DL — HIGH (ref 70–99)
HCT VFR BLD CALC: 42 % — SIGNIFICANT CHANGE UP (ref 39–50)
HGB BLD-MCNC: 14.1 G/DL — SIGNIFICANT CHANGE UP (ref 13–17)
IANC: 3.38 K/UL — SIGNIFICANT CHANGE UP (ref 1.5–8.5)
IMM GRANULOCYTES NFR BLD AUTO: 0.2 % — SIGNIFICANT CHANGE UP (ref 0–1.5)
LYMPHOCYTES # BLD AUTO: 1.48 K/UL — SIGNIFICANT CHANGE UP (ref 1–3.3)
LYMPHOCYTES # BLD AUTO: 26 % — SIGNIFICANT CHANGE UP (ref 13–44)
MAGNESIUM SERPL-MCNC: 2.2 MG/DL — SIGNIFICANT CHANGE UP (ref 1.6–2.6)
MCHC RBC-ENTMCNC: 30.3 PG — SIGNIFICANT CHANGE UP (ref 27–34)
MCHC RBC-ENTMCNC: 33.6 GM/DL — SIGNIFICANT CHANGE UP (ref 32–36)
MCV RBC AUTO: 90.1 FL — SIGNIFICANT CHANGE UP (ref 80–100)
MONOCYTES # BLD AUTO: 0.55 K/UL — SIGNIFICANT CHANGE UP (ref 0–0.9)
MONOCYTES NFR BLD AUTO: 9.7 % — SIGNIFICANT CHANGE UP (ref 2–14)
NEUTROPHILS # BLD AUTO: 3.38 K/UL — SIGNIFICANT CHANGE UP (ref 1.8–7.4)
NEUTROPHILS NFR BLD AUTO: 59.4 % — SIGNIFICANT CHANGE UP (ref 43–77)
NRBC # BLD: 0 /100 WBCS — SIGNIFICANT CHANGE UP
NRBC # FLD: 0 K/UL — SIGNIFICANT CHANGE UP
PHOSPHATE SERPL-MCNC: 3.5 MG/DL — SIGNIFICANT CHANGE UP (ref 2.5–4.5)
PLATELET # BLD AUTO: 209 K/UL — SIGNIFICANT CHANGE UP (ref 150–400)
POTASSIUM SERPL-MCNC: 4.3 MMOL/L — SIGNIFICANT CHANGE UP (ref 3.5–5.3)
POTASSIUM SERPL-SCNC: 4.3 MMOL/L — SIGNIFICANT CHANGE UP (ref 3.5–5.3)
RBC # BLD: 4.66 M/UL — SIGNIFICANT CHANGE UP (ref 4.2–5.8)
RBC # FLD: 11.9 % — SIGNIFICANT CHANGE UP (ref 10.3–14.5)
SODIUM SERPL-SCNC: 137 MMOL/L — SIGNIFICANT CHANGE UP (ref 135–145)
SPECIMEN SOURCE: SIGNIFICANT CHANGE UP
T4 FREE SERPL-MCNC: 1 NG/DL — SIGNIFICANT CHANGE UP (ref 0.9–1.8)
TSH SERPL-MCNC: 7.98 UIU/ML — HIGH (ref 0.27–4.2)
VANCOMYCIN TROUGH SERPL-MCNC: 5.1 UG/ML — LOW (ref 10–20)
VANCOMYCIN TROUGH SERPL-MCNC: 5.2 UG/ML — LOW (ref 10–20)
WBC # BLD: 5.69 K/UL — SIGNIFICANT CHANGE UP (ref 3.8–10.5)
WBC # FLD AUTO: 5.69 K/UL — SIGNIFICANT CHANGE UP (ref 3.8–10.5)

## 2021-11-29 PROCEDURE — 99233 SBSQ HOSP IP/OBS HIGH 50: CPT | Mod: GC

## 2021-11-29 RX ORDER — VANCOMYCIN HCL 1 G
1000 VIAL (EA) INTRAVENOUS EVERY 12 HOURS
Refills: 0 | Status: DISCONTINUED | OUTPATIENT
Start: 2021-11-29 | End: 2021-12-01

## 2021-11-29 RX ADMIN — Medication 250 MILLIGRAM(S): at 19:24

## 2021-11-29 RX ADMIN — AMPICILLIN SODIUM AND SULBACTAM SODIUM 200 GRAM(S): 250; 125 INJECTION, POWDER, FOR SUSPENSION INTRAMUSCULAR; INTRAVENOUS at 20:35

## 2021-11-29 RX ADMIN — Medication 75 MICROGRAM(S): at 07:05

## 2021-11-29 RX ADMIN — Medication 250 MILLIGRAM(S): at 07:55

## 2021-11-29 RX ADMIN — ATORVASTATIN CALCIUM 10 MILLIGRAM(S): 80 TABLET, FILM COATED ORAL at 21:24

## 2021-11-29 RX ADMIN — AMPICILLIN SODIUM AND SULBACTAM SODIUM 200 GRAM(S): 250; 125 INJECTION, POWDER, FOR SUSPENSION INTRAMUSCULAR; INTRAVENOUS at 02:03

## 2021-11-29 RX ADMIN — AMPICILLIN SODIUM AND SULBACTAM SODIUM 200 GRAM(S): 250; 125 INJECTION, POWDER, FOR SUSPENSION INTRAMUSCULAR; INTRAVENOUS at 09:46

## 2021-11-29 RX ADMIN — AMPICILLIN SODIUM AND SULBACTAM SODIUM 200 GRAM(S): 250; 125 INJECTION, POWDER, FOR SUSPENSION INTRAMUSCULAR; INTRAVENOUS at 14:17

## 2021-11-29 NOTE — PROGRESS NOTE ADULT - SUBJECTIVE AND OBJECTIVE BOX
Charlie Yun M.D.  [facility] PGY-1    PROGRESS NOTE:     Patient is a 51y old  Male who presents with a chief complaint of Jaw Swelling (28 Nov 2021 19:21)      -OVERNIGHT EVENTS-      -SUBJECTIVE-      MEDICATIONS  (STANDING):  ampicillin/sulbactam  IVPB 3 Gram(s) IV Intermittent every 6 hours  atorvastatin 10 milliGRAM(s) Oral at bedtime  levothyroxine 75 MICROGram(s) Oral daily  vancomycin  IVPB 750 milliGRAM(s) IV Intermittent every 12 hours    MEDICATIONS  (PRN):  acetaminophen     Tablet .. 975 milliGRAM(s) Oral every 6 hours PRN Temp greater or equal to 38C (100.4F), Mild Pain (1 - 3), Moderate Pain (4 - 6), Severe Pain (7 - 10)      -OBJECTIVE-    CAPILLARY BLOOD GLUCOSE        I&O's Summary    28 Nov 2021 07:01  -  29 Nov 2021 07:00  --------------------------------------------------------  IN: 1600 mL / OUT: 250 mL / NET: 1350 mL        VITAL SIGNS  Vital Signs Last 24 Hrs  T(C): 36.6 (29 Nov 2021 07:04), Max: 37.2 (28 Nov 2021 14:48)  T(F): 97.9 (29 Nov 2021 07:04), Max: 99 (28 Nov 2021 14:48)  HR: 60 (29 Nov 2021 07:04) (60 - 75)  BP: 120/76 (29 Nov 2021 07:04) (111/61 - 126/77)  BP(mean): --  RR: 18 (29 Nov 2021 07:04) (18 - 18)  SpO2: 98% (29 Nov 2021 07:04) (98% - 100%)    PHYSICAL EXAM:  GENERAL/CONSTITUTIONAL: NAD, Awake, AOx3 (person, place, time)  HEENT: NCAT  ---EYES: no scleral icterus, EOMI, PERRLA  ---ENMT: MMM,   ---NECK: No palpable masses; no thyromegaly  CARDIO: RRR. Normal S1/S2, no m/r/g.  RESP: Normal respiratory effort; CTAB, no w/r/r  ABD: Soft, NTND; +BS.   : No CVAT.   MSK: No obvious deformity or ROM deficit.  ---EXTREMITIES: No peripheral edema. Peripheral pulses 2+ x4.  SKIN: Warm, dry. No rashes.   NEURO: Moves all four extremities spontaneously  PSYCH: Appropriate mood & affect. No VH/AH. No SI/HI.    LABS:                        14.1   5.69  )-----------( 209      ( 29 Nov 2021 05:21 )             42.0     11-29    137  |  103  |  14  ----------------------------<  110<H>  4.3   |  26  |  0.82    Ca    8.9      29 Nov 2021 05:21  Phos  3.5     11-29  Mg     2.20     11-29      MICROBIOLOGY    Culture - Acid Fast - Other w/Smear (collected 27 Nov 2021 18:17)  Source: .Other Other, Oral    Culture - Abscess with Gram Stain (collected 27 Nov 2021 18:16)  Source: .Abscess Mouth  Preliminary Report (28 Nov 2021 19:19):    Normal mouth polo isolated        IMAGING        COORDINATION OF CARE:  ---PCP: Taisha Godoy ([place])  ---Consultants: Dental/OMFS, Plastics (Signed off)   Charlie Yun M.D.  [facility] PGY-1    PROGRESS NOTE:     Patient is a 51y old  Male who presents with a chief complaint of Jaw Swelling (28 Nov 2021 19:21)      -OVERNIGHT EVENTS-  - S/P I&D with Dental  - Plastics Sign off    -SUBJECTIVE-  Mr. Fulton says that he still has some pain this moringn    MEDICATIONS  (STANDING):  ampicillin/sulbactam  IVPB 3 Gram(s) IV Intermittent every 6 hours  atorvastatin 10 milliGRAM(s) Oral at bedtime  levothyroxine 75 MICROGram(s) Oral daily  vancomycin  IVPB 750 milliGRAM(s) IV Intermittent every 12 hours    MEDICATIONS  (PRN):  acetaminophen     Tablet .. 975 milliGRAM(s) Oral every 6 hours PRN Temp greater or equal to 38C (100.4F), Mild Pain (1 - 3), Moderate Pain (4 - 6), Severe Pain (7 - 10)      -OBJECTIVE-    CAPILLARY BLOOD GLUCOSE        I&O's Summary    28 Nov 2021 07:01  -  29 Nov 2021 07:00  --------------------------------------------------------  IN: 1600 mL / OUT: 250 mL / NET: 1350 mL        VITAL SIGNS  Vital Signs Last 24 Hrs  T(C): 36.6 (29 Nov 2021 07:04), Max: 37.2 (28 Nov 2021 14:48)  T(F): 97.9 (29 Nov 2021 07:04), Max: 99 (28 Nov 2021 14:48)  HR: 60 (29 Nov 2021 07:04) (60 - 75)  BP: 120/76 (29 Nov 2021 07:04) (111/61 - 126/77)  BP(mean): --  RR: 18 (29 Nov 2021 07:04) (18 - 18)  SpO2: 98% (29 Nov 2021 07:04) (98% - 100%)    PHYSICAL EXAM:  GENERAL/CONSTITUTIONAL: NAD, Awake, AOx3 (person, place, time)  HEENT: NCAT  ---EYES: no scleral icterus, EOMI, PERRLA  ---ENMT: MMM,   ---NECK: No palpable masses; no thyromegaly  CARDIO: RRR. Normal S1/S2, no m/r/g.  RESP: Normal respiratory effort; CTAB, no w/r/r  ABD: Soft, NTND; +BS.   : No CVAT.   MSK: No obvious deformity or ROM deficit.  ---EXTREMITIES: No peripheral edema. Peripheral pulses 2+ x4.  SKIN: Warm, dry. No rashes.   NEURO: Moves all four extremities spontaneously  PSYCH: Appropriate mood & affect. No VH/AH. No SI/HI.    LABS:                        14.1   5.69  )-----------( 209      ( 29 Nov 2021 05:21 )             42.0     11-29    137  |  103  |  14  ----------------------------<  110<H>  4.3   |  26  |  0.82    Ca    8.9      29 Nov 2021 05:21  Phos  3.5     11-29  Mg     2.20     11-29      MICROBIOLOGY    Culture - Acid Fast - Other w/Smear (collected 27 Nov 2021 18:17)  Source: .Other Other, Oral    Culture - Abscess with Gram Stain (collected 27 Nov 2021 18:16)  Source: .Abscess Mouth  Preliminary Report (28 Nov 2021 19:19):    Normal mouth polo isolated        IMAGING        COORDINATION OF CARE:  ---PCP: Taisha Godoy ([facility])  ---Consultants: Dental/OMFS, Plastics (Signed off)   Charlie Yun M.D.  [facility] PGY-1    PROGRESS NOTE:     Patient is a 51y old  Male who presents with a chief complaint of Jaw Swelling (28 Nov 2021 19:21)      -OVERNIGHT EVENTS-  - S/P I&D with Dental  - Plastics Sign off    -SUBJECTIVE-  Interview Conducted w/ Dr. Vila    Mr. Fulton says that he still has some pain this morning but denies other common complaints.     MEDICATIONS  (STANDING):  ampicillin/sulbactam  IVPB 3 Gram(s) IV Intermittent every 6 hours  atorvastatin 10 milliGRAM(s) Oral at bedtime  levothyroxine 75 MICROGram(s) Oral daily  vancomycin  IVPB 750 milliGRAM(s) IV Intermittent every 12 hours    MEDICATIONS  (PRN):  acetaminophen     Tablet .. 975 milliGRAM(s) Oral every 6 hours PRN Temp greater or equal to 38C (100.4F), Mild Pain (1 - 3), Moderate Pain (4 - 6), Severe Pain (7 - 10)      -OBJECTIVE-    CAPILLARY BLOOD GLUCOSE        I&O's Summary    28 Nov 2021 07:01  -  29 Nov 2021 07:00  --------------------------------------------------------  IN: 1600 mL / OUT: 250 mL / NET: 1350 mL        VITAL SIGNS  Vital Signs Last 24 Hrs  T(C): 36.6 (29 Nov 2021 07:04), Max: 37.2 (28 Nov 2021 14:48)  T(F): 97.9 (29 Nov 2021 07:04), Max: 99 (28 Nov 2021 14:48)  HR: 60 (29 Nov 2021 07:04) (60 - 75)  BP: 120/76 (29 Nov 2021 07:04) (111/61 - 126/77)  BP(mean): --  RR: 18 (29 Nov 2021 07:04) (18 - 18)  SpO2: 98% (29 Nov 2021 07:04) (98% - 100%)    PHYSICAL EXAM:  GENERAL/CONSTITUTIONAL: NAD, Awake, AOx3 (person, place, time)  HEENT: NCAT  ---EYES: no scleral icterus, EOMI, PERRLA  ---ENMT: MMM,   ---MOUTH: Silk suture intact, in place; drain visualized intraorally with out obvious ongoing discharge  CARDIO: RRR.   RESP: Normal respiratory effort;   ABD: Soft, NTND  MSK: No obvious deformity or ROM deficit.  ---EXTREMITIES: No peripheral edema.  SKIN: Warm, dry. No rashes.   NEURO: Moves all four extremities spontaneously  PSYCH: Appropriate mood & affect.    LABS:                        14.1   5.69  )-----------( 209      ( 29 Nov 2021 05:21 )             42.0     11-29    137  |  103  |  14  ----------------------------<  110<H>  4.3   |  26  |  0.82    Ca    8.9      29 Nov 2021 05:21  Phos  3.5     11-29  Mg     2.20     11-29      MICROBIOLOGY    Culture - Acid Fast - Other w/Smear (collected 27 Nov 2021 18:17)  Source: .Other Other, Oral    Culture - Abscess with Gram Stain (collected 27 Nov 2021 18:16)  Source: .Abscess Mouth  Preliminary Report (28 Nov 2021 19:19):    Normal mouth polo isolated        IMAGING        COORDINATION OF CARE:  ---PCP: Taisha Godoy ([facility])  ---Consultants: Dental/OMFS, Plastics (Signed off)

## 2021-11-29 NOTE — PROGRESS NOTE ADULT - PROBLEM SELECTOR PLAN 4
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 Upon review, could be acrofacial vitiligo or "lip tip" vitiligo; also curious about chemical leukoderma (d/t work as ). Overall, not likely to require aggressive management at this time, but may benefit from Derm C/S during the week to r/o more sinister etiologies as it seems pt may not have great access to care.  - CTM  - Consider Derm C/S if still here on Monday He has relatively extensive vitiliginous hypopigmentations on bilateral hands and periorally. Upon review, could be acrofacial vitiligo or "lip tip" vitiligo; also curious about chemical leukoderma (d/t work as ). Overall, not likely to require aggressive management at this time, but may benefit from Derm C/S during the week to r/o more sinister etiologies as it seems pt may not have great access to care.  - CTM  - Will attempt to expedite derm f/u as OP

## 2021-11-29 NOTE — PROGRESS NOTE ADULT - PROBLEM SELECTOR PLAN 2
H/O Hypothyroidism. Will check TSH or collect collateral from prescriber  - C/W Home Levothyroxine 75ug H/O Hypothyroidism. TSH slightly elevated, T4 WNL. Likely 2/2 active stressors, but will communicate results in discharge summary.  - C/W Home Levothyroxine 75ug

## 2021-11-29 NOTE — CONSULT NOTE ADULT - ASSESSMENT
51M w/ L facial swelling a/w tooth #18 and mandibular implant. Plan for removal of mandibular implant and extraction of tooth in OR. Pt added-on for OR tomorrow (11/30).    - IV abx   - analgesia prn  - HOB elevated  - Peridex BID  - NPO at midnight  - Pt 2nd on add-on list for OR for 11/30. Plan for ext of #18, removal of mandibular/chin implant in OR w/ Dr. Rousseau

## 2021-11-29 NOTE — PROGRESS NOTE ADULT - PROBLEM SELECTOR PLAN 5
Hospital Bundle  Fluids: PO ad margareth  Electrolytes: Replete K > 4, Mg > 2, Phos > 3  Nutrition: Diet DASH (NPO @ MN?)  PPX  ---VTE: SCDs, ambulate  ---GI: N/A  ---Resp: N/A  Access: PIV  Code Status: FULL CODE  Dispo: Pending surgical management, likely to home when cleared Hospital Bundle  Fluids: PO ad margareth (until NPO)  Electrolytes: Replete K > 4, Mg > 2, Phos > 3  Nutrition: Diet DASH, NPO @ MN for OR (per OMFS)  PPX  ---VTE: SCDs, ambulate  ---GI: N/A  ---Resp: N/A  Access: PIV  Code Status: FULL CODE  Dispo: Pending surgical management, likely to home when cleared

## 2021-11-29 NOTE — CONSULT NOTE ADULT - SUBJECTIVE AND OBJECTIVE BOX
52 Y/O M PMH Hypothyroidism, Hyperlipidemia states that he has been having L sided extraoral pain for the past 2 months. Pt states he went to [facility]. Pt states he did not have any intervention performed there. Pt states he was given Amoxicillin by his PMD which he has been taking without improvement. Pt denies any other sx or acute complaints. Pt was seen at [facility], was given Clindamycin and had a CT performed, pt was transferred to [facility] on 11/27, and admitted to medicine.   Pt seen in ED by general dentistry who performed and I&D and placement of penrose drain. Hemopurulence expressed.   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   OMFS consulted to evaluate swelling and for treatment options.     OBJECTIVE:  ICU Vital Signs Last 24 Hrs  T(C): 36.7 (29 Nov 2021 13:40), Max: 36.8 (28 Nov 2021 22:22)  T(F): 98.1 (29 Nov 2021 13:40), Max: 98.2 (28 Nov 2021 22:22)  HR: 67 (29 Nov 2021 13:40) (60 - 67)  BP: 135/78 (29 Nov 2021 13:40) (120/76 - 135/78)  BP(mean): --  ABP: --  ABP(mean): --  RR: 16 (29 Nov 2021 13:40) (16 - 18)  SpO2: 99% (29 Nov 2021 13:40) (98% - 99%)    I&O's Detail    28 Nov 2021 07:01  -  29 Nov 2021 07:00  --------------------------------------------------------  IN:    IV PiggyBack: 350 mL    Oral Fluid: 1250 mL  Total IN: 1600 mL    OUT:    Voided (mL): 250 mL  Total OUT: 250 mL    Total NET: 1350 mL      29 Nov 2021 07:01  -  29 Nov 2021 21:13  --------------------------------------------------------  IN:    IV PiggyBack: 100 mL    Oral Fluid: 240 mL  Total IN: 340 mL    OUT:  Total OUT: 0 mL    Total NET: 340 mL      Limited physical exam:   Gen: AAOx4, NAD  EOE: moderate left anterior mandible swelling, TTP, warm to touch, nonerythematous, inferior border of the mandible is palpable throughout.   IOE: TAMMI ~40mm, occlusion stable, left vestibular swelling, penrose drain in place and sutured. TTP, no purulence noted.                  CBC Full  -  ( 29 Nov 2021 05:21 )  WBC Count : 5.69 K/uL  RBC Count : 4.66 M/uL  Hemoglobin : 14.1 g/dL  Hematocrit : 42.0 %  Platelet Count - Automated : 209 K/uL  Mean Cell Volume : 90.1 fL  Mean Cell Hemoglobin : 30.3 pg  Mean Cell Hemoglobin Concentration : 33.6 gm/dL  Auto Neutrophil # : 3.38 K/uL  Auto Lymphocyte # : 1.48 K/uL  Auto Monocyte # : 0.55 K/uL  Auto Eosinophil # : 0.24 K/uL  Auto Basophil # : 0.03 K/uL  Auto Neutrophil % : 59.4 %  Auto Lymphocyte % : 26.0 %  Auto Monocyte % : 9.7 %  Auto Eosinophil % : 4.2 %  Auto Basophil % : 0.5 %    11-29    137  |  103  |  14  ----------------------------<  110<H>  4.3   |  26  |  0.82    Ca    8.9      29 Nov 2021 05:21  Phos  3.5     11-29  Mg     2.20     11-29    MEDICATIONS  (STANDING):  ampicillin/sulbactam  IVPB 3 Gram(s) IV Intermittent every 6 hours  atorvastatin 10 milliGRAM(s) Oral at bedtime  levothyroxine 75 MICROGram(s) Oral daily  vancomycin  IVPB 1000 milliGRAM(s) IV Intermittent every 12 hours    MEDICATIONS  (PRN):  acetaminophen     Tablet .. 975 milliGRAM(s) Oral every 6 hours PRN Temp greater or equal to 38C (100.4F), Mild Pain (1 - 3), Moderate Pain (4 - 6), Severe Pain (7 - 10)

## 2021-11-29 NOTE — PROGRESS NOTE ADULT - PROBLEM SELECTOR PLAN 1
Thorough documentation of subjective included above. DDx includes infectious vs non-infectious swelling, including or excluding the implant he's reported to have had placed. Cultures of reported aspirate have been sent (though documentation of who collect or where the sample came from is as yet unclear).  Diagnostics  - Plastics C/S, Appreciate Recs  - OMFS/Dental C/S, Appreciate Recs  [ ] CT Max Face: reports that will be over-read or updated by [facility] radiologist Monday  [ ] MR Facial Bones c Contrast    Therapeutics  - Pain Control: APAP 975mg PO Q6H PRN  - C/W Ampicillin/Sulbactam ("Unasyn") 3g IV Q6H (11/27-)  - C/W Vancomycin 750mg IV Q12H (11/27-) Thorough documentation of subjective included above. DDx includes infectious vs non-infectious swelling, including or excluding the implant he's reported to have had placed. Cultures of reported aspirate have been sent (though documentation of who collect or where the sample came from is as yet unclear).  Diagnostics  - Plastics C/S, Appreciate Recs  - OMFS/Dental C/S, Appreciate Recs  [ ] CT Max Face: reports that will be over-read or updated by [facility] radiologist Monday  [x] MR Facial Bones c Contrast: c/f ondotogenic origin, communicated with plastics attg who defers to OMFS; OMFS will evaluate    Therapeutics  - Pain Control: APAP 975mg PO Q6H PRN  - C/W Ampicillin/Sulbactam ("Unasyn") 3g IV Q6H (11/27-)  - C/W Vancomycin 750mg IV Q12H (11/27-) (Vanc trough before pm dose)

## 2021-11-30 ENCOUNTER — RESULT REVIEW (OUTPATIENT)
Age: 51
End: 2021-11-30

## 2021-11-30 LAB
ANION GAP SERPL CALC-SCNC: 9 MMOL/L — SIGNIFICANT CHANGE UP (ref 7–14)
APTT BLD: 32.4 SEC — SIGNIFICANT CHANGE UP (ref 27–36.3)
BASOPHILS # BLD AUTO: 0.02 K/UL — SIGNIFICANT CHANGE UP (ref 0–0.2)
BASOPHILS NFR BLD AUTO: 0.4 % — SIGNIFICANT CHANGE UP (ref 0–2)
BUN SERPL-MCNC: 15 MG/DL — SIGNIFICANT CHANGE UP (ref 7–23)
CALCIUM SERPL-MCNC: 8.9 MG/DL — SIGNIFICANT CHANGE UP (ref 8.4–10.5)
CHLORIDE SERPL-SCNC: 102 MMOL/L — SIGNIFICANT CHANGE UP (ref 98–107)
CO2 SERPL-SCNC: 27 MMOL/L — SIGNIFICANT CHANGE UP (ref 22–31)
CREAT SERPL-MCNC: 0.82 MG/DL — SIGNIFICANT CHANGE UP (ref 0.5–1.3)
EOSINOPHIL # BLD AUTO: 0.22 K/UL — SIGNIFICANT CHANGE UP (ref 0–0.5)
EOSINOPHIL NFR BLD AUTO: 3.9 % — SIGNIFICANT CHANGE UP (ref 0–6)
GLUCOSE SERPL-MCNC: 127 MG/DL — HIGH (ref 70–99)
GRAM STN FLD: SIGNIFICANT CHANGE UP
HCT VFR BLD CALC: 45.3 % — SIGNIFICANT CHANGE UP (ref 39–50)
HGB BLD-MCNC: 15.1 G/DL — SIGNIFICANT CHANGE UP (ref 13–17)
IANC: 2.86 K/UL — SIGNIFICANT CHANGE UP (ref 1.5–8.5)
IMM GRANULOCYTES NFR BLD AUTO: 0.2 % — SIGNIFICANT CHANGE UP (ref 0–1.5)
INR BLD: 1.07 RATIO — SIGNIFICANT CHANGE UP (ref 0.88–1.16)
LYMPHOCYTES # BLD AUTO: 2.05 K/UL — SIGNIFICANT CHANGE UP (ref 1–3.3)
LYMPHOCYTES # BLD AUTO: 36 % — SIGNIFICANT CHANGE UP (ref 13–44)
MAGNESIUM SERPL-MCNC: 2.2 MG/DL — SIGNIFICANT CHANGE UP (ref 1.6–2.6)
MCHC RBC-ENTMCNC: 30.6 PG — SIGNIFICANT CHANGE UP (ref 27–34)
MCHC RBC-ENTMCNC: 33.3 GM/DL — SIGNIFICANT CHANGE UP (ref 32–36)
MCV RBC AUTO: 91.7 FL — SIGNIFICANT CHANGE UP (ref 80–100)
MONOCYTES # BLD AUTO: 0.53 K/UL — SIGNIFICANT CHANGE UP (ref 0–0.9)
MONOCYTES NFR BLD AUTO: 9.3 % — SIGNIFICANT CHANGE UP (ref 2–14)
NEUTROPHILS # BLD AUTO: 2.86 K/UL — SIGNIFICANT CHANGE UP (ref 1.8–7.4)
NEUTROPHILS NFR BLD AUTO: 50.2 % — SIGNIFICANT CHANGE UP (ref 43–77)
NRBC # BLD: 0 /100 WBCS — SIGNIFICANT CHANGE UP
NRBC # FLD: 0 K/UL — SIGNIFICANT CHANGE UP
PHOSPHATE SERPL-MCNC: 3.5 MG/DL — SIGNIFICANT CHANGE UP (ref 2.5–4.5)
PLATELET # BLD AUTO: 240 K/UL — SIGNIFICANT CHANGE UP (ref 150–400)
POTASSIUM SERPL-MCNC: 4.2 MMOL/L — SIGNIFICANT CHANGE UP (ref 3.5–5.3)
POTASSIUM SERPL-SCNC: 4.2 MMOL/L — SIGNIFICANT CHANGE UP (ref 3.5–5.3)
PROTHROM AB SERPL-ACNC: 12.2 SEC — SIGNIFICANT CHANGE UP (ref 10.6–13.6)
RBC # BLD: 4.94 M/UL — SIGNIFICANT CHANGE UP (ref 4.2–5.8)
RBC # FLD: 11.7 % — SIGNIFICANT CHANGE UP (ref 10.3–14.5)
SODIUM SERPL-SCNC: 138 MMOL/L — SIGNIFICANT CHANGE UP (ref 135–145)
SPECIMEN SOURCE: SIGNIFICANT CHANGE UP
WBC # BLD: 5.69 K/UL — SIGNIFICANT CHANGE UP (ref 3.8–10.5)
WBC # FLD AUTO: 5.69 K/UL — SIGNIFICANT CHANGE UP (ref 3.8–10.5)

## 2021-11-30 PROCEDURE — 99233 SBSQ HOSP IP/OBS HIGH 50: CPT | Mod: GC

## 2021-11-30 PROCEDURE — 88300 SURGICAL PATH GROSS: CPT | Mod: 26,59

## 2021-11-30 PROCEDURE — 99222 1ST HOSP IP/OBS MODERATE 55: CPT | Mod: GC

## 2021-11-30 PROCEDURE — 88304 TISSUE EXAM BY PATHOLOGIST: CPT | Mod: 26

## 2021-11-30 RX ORDER — ACETAMINOPHEN 500 MG
650 TABLET ORAL EVERY 6 HOURS
Refills: 0 | Status: DISCONTINUED | OUTPATIENT
Start: 2021-11-30 | End: 2021-12-03

## 2021-11-30 RX ORDER — SODIUM CHLORIDE 9 MG/ML
1000 INJECTION, SOLUTION INTRAVENOUS
Refills: 0 | Status: DISCONTINUED | OUTPATIENT
Start: 2021-11-30 | End: 2021-11-30

## 2021-11-30 RX ORDER — CHLORHEXIDINE GLUCONATE 213 G/1000ML
15 SOLUTION TOPICAL
Refills: 0 | Status: DISCONTINUED | OUTPATIENT
Start: 2021-11-30 | End: 2021-12-03

## 2021-11-30 RX ORDER — KETOROLAC TROMETHAMINE 30 MG/ML
15 SYRINGE (ML) INJECTION EVERY 6 HOURS
Refills: 0 | Status: DISCONTINUED | OUTPATIENT
Start: 2021-11-30 | End: 2021-12-03

## 2021-11-30 RX ORDER — OXYCODONE HYDROCHLORIDE 5 MG/1
5 TABLET ORAL EVERY 6 HOURS
Refills: 0 | Status: DISCONTINUED | OUTPATIENT
Start: 2021-11-30 | End: 2021-12-03

## 2021-11-30 RX ORDER — DEXTROSE MONOHYDRATE, SODIUM CHLORIDE, AND POTASSIUM CHLORIDE 50; .745; 4.5 G/1000ML; G/1000ML; G/1000ML
1000 INJECTION, SOLUTION INTRAVENOUS
Refills: 0 | Status: DISCONTINUED | OUTPATIENT
Start: 2021-11-30 | End: 2021-11-30

## 2021-11-30 RX ORDER — HYDROMORPHONE HYDROCHLORIDE 2 MG/ML
0.5 INJECTION INTRAMUSCULAR; INTRAVENOUS; SUBCUTANEOUS
Refills: 0 | Status: DISCONTINUED | OUTPATIENT
Start: 2021-11-30 | End: 2021-11-30

## 2021-11-30 RX ADMIN — SODIUM CHLORIDE 100 MILLILITER(S): 9 INJECTION, SOLUTION INTRAVENOUS at 07:17

## 2021-11-30 RX ADMIN — Medication 250 MILLIGRAM(S): at 18:07

## 2021-11-30 RX ADMIN — AMPICILLIN SODIUM AND SULBACTAM SODIUM 200 GRAM(S): 250; 125 INJECTION, POWDER, FOR SUSPENSION INTRAMUSCULAR; INTRAVENOUS at 13:33

## 2021-11-30 RX ADMIN — OXYCODONE HYDROCHLORIDE 5 MILLIGRAM(S): 5 TABLET ORAL at 14:02

## 2021-11-30 RX ADMIN — OXYCODONE HYDROCHLORIDE 5 MILLIGRAM(S): 5 TABLET ORAL at 13:02

## 2021-11-30 RX ADMIN — CHLORHEXIDINE GLUCONATE 15 MILLILITER(S): 213 SOLUTION TOPICAL at 18:06

## 2021-11-30 RX ADMIN — AMPICILLIN SODIUM AND SULBACTAM SODIUM 200 GRAM(S): 250; 125 INJECTION, POWDER, FOR SUSPENSION INTRAMUSCULAR; INTRAVENOUS at 06:57

## 2021-11-30 RX ADMIN — ATORVASTATIN CALCIUM 10 MILLIGRAM(S): 80 TABLET, FILM COATED ORAL at 22:38

## 2021-11-30 RX ADMIN — AMPICILLIN SODIUM AND SULBACTAM SODIUM 200 GRAM(S): 250; 125 INJECTION, POWDER, FOR SUSPENSION INTRAMUSCULAR; INTRAVENOUS at 02:11

## 2021-11-30 RX ADMIN — AMPICILLIN SODIUM AND SULBACTAM SODIUM 200 GRAM(S): 250; 125 INJECTION, POWDER, FOR SUSPENSION INTRAMUSCULAR; INTRAVENOUS at 19:02

## 2021-11-30 RX ADMIN — HYDROMORPHONE HYDROCHLORIDE 0.5 MILLIGRAM(S): 2 INJECTION INTRAMUSCULAR; INTRAVENOUS; SUBCUTANEOUS at 10:26

## 2021-11-30 RX ADMIN — SODIUM CHLORIDE 75 MILLILITER(S): 9 INJECTION, SOLUTION INTRAVENOUS at 10:26

## 2021-11-30 RX ADMIN — Medication 250 MILLIGRAM(S): at 06:11

## 2021-11-30 NOTE — CHART NOTE - NSCHARTNOTEFT_GEN_A_CORE
MARYANNE MONGE  [id number]    Condition:  Dx:  Planned procedure:    Relevant active conditions:   - Cardiac (Valvular/ Arrhyth/ CHF/Excertional)   - Pulm [COPD/Asthma/pHTN/IPF](SOB/Cough/sputum)   - Renal, GI   - Metabolic (DM/Thyroid)/Immuno-Auto/Neoplasm   - Infection/Hematological/Thromboembolic    Use of Immunomodifier-suppressant/NSAID/ASA/Vit E/ A/C. Insulin/Abx    VS: T(C): 36.6 (11-30-21 @ 06:37), Max: 37.1 (11-29-21 @ 21:30)  HR: 61 (11-30-21 @ 06:37) (59 - 67)  BP: 120/81 (11-30-21 @ 06:37) (120/81 - 135/78)  RR: 16 (11-30-21 @ 06:37) (16 - 17)  SpO2: 99% (11-30-21 @ 06:37) (98% - 99%)    ***insert PE          Patient seen and examined today Plan discussed/Questions answered  (with patient/ancillary staff/colleagues).  Chart notes reviewed.  Notable interval events reviewed.    RCRI:  ACS-NSQIP:   Trivedi:     Management of comorbid conditions/medications as follows:      Patient is medically cleared for the surgery as above. No contraindication. MARYANNE MONGE  [id number]    Condition: Left Mandibular Abscess, Superimposed Infection on Left Mandibular Implant Material  Dx: as above  Planned procedure: Removal of Implant Material, Tooth Extraction    Relevant active conditions:  #HLD: Well controlled, C/W Atorvastatin.  #Hypothyroidism: TSH only slightly elevated, well controlled.  #Left mandibular Abscess: C/W Unasyn, Vanc. Plan to touch base with ID after surgical source control.    Use of Immunomodifier-suppressant/NSAID/ASA/Vit E/ A/C. Insulin/Abx    VS: T(C): 36.6 (11-30-21 @ 06:37), Max: 37.1 (11-29-21 @ 21:30)  HR: 61 (11-30-21 @ 06:37) (59 - 67)  BP: 120/81 (11-30-21 @ 06:37) (120/81 - 135/78)  RR: 16 (11-30-21 @ 06:37) (16 - 17)  SpO2: 99% (11-30-21 @ 06:37) (98% - 99%)    Physical Exam  - Please reference PEx from inpatient progress note from same day    Patient seen and examined today Plan discussed/Questions answered  (with patient/ancillary staff/colleagues).  Chart notes reviewed.  Notable interval events reviewed.    RCRI: 0 Points (Class I Risk), 3.9% 30-day risk of death, MI, or cardiac arrest  ACS-NSQIP: Less than average risk based on "removal of implant material" surgical code  Trivedi: 0.0% Risk of MI, Cardiac Arrest - intraoperatively or 30 days post-operatively    Management of comorbid conditions/medications as follows:    #Hypothyroidism: C/W levothyroxine  #Hyperlipidemia: C/W Atorvastatin    Patient is medically cleared for the surgery as above. No contraindication.    Charlie Yun MD PGY-1

## 2021-11-30 NOTE — PACU DISCHARGE NOTE - COMMENTS
Nurses unable to upload Sharmaine score or vitals into EMR. Sharmaine is 10, Vitals are as follows: P64, SpO2 96% on RA, /70, R12. Pt appears well clinically, stable to go to floor

## 2021-11-30 NOTE — PROGRESS NOTE ADULT - PROBLEM SELECTOR PLAN 4
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 Upon review, could be acrofacial vitiligo or "lip tip" vitiligo; also curious about chemical leukoderma (d/t work as ). Overall, not likely to require aggressive management at this time, but may benefit from Derm C/S during the week to r/o more sinister etiologies as it seems pt may not have great access to care.  - CTM  - Will attempt to expedite derm f/u as OP

## 2021-11-30 NOTE — PROGRESS NOTE ADULT - PROBLEM SELECTOR PLAN 1
Thorough documentation of subjective included above. DDx includes infectious vs non-infectious swelling, including or excluding the implant he's reported to have had placed. Cultures of reported aspirate have been sent (though documentation of who collect or where the sample came from is as yet unclear). So far normal oral polo, which isn't too surprising.   Diagnostics  - Plastics C/S, Appreciate Recs  - OMFS/Dental C/S, Appreciate Recs  [ ] CT Max Face: reports that will be over-read or updated by [facility] radiologist Monday  [x] MR Facial Bones c Contrast: c/f ondotogenic origin, communicated with plastics attg who defers to OMFS; OMFS will evaluate    Therapeutics  - Pain Control: APAP 975mg PO Q6H PRN  - C/W Ampicillin/Sulbactam ("Unasyn") 3g IV Q6H (11/27-)  - C/W Vancomycin 1000mg IV Q12H (11/27-) (Vanc Trough After 4th Dose if still necessary) Thorough documentation of subjective included above. DDx includes infectious vs non-infectious swelling, including or excluding the implant he's reported to have had placed. Cultures of reported aspirate have been sent (though documentation of who collect or where the sample came from is as yet unclear). So far normal oral polo, which isn't too surprising.   Diagnostics  - Plastics C/S, Appreciate Recs (signed off)  - OMFS/Dental C/S s/p I&D & operation, f/u post-op recs PRN  [x] MR Facial Bones c Contrast: c/f ondotogenic origin, communicated with plastics attg who defers to OMFS; OMFS will evaluate    Therapeutics  - Pain Control: APAP 650mg PO Q6H PRN, Toradol 15mg IV PRN  - C/W Ampicillin/Sulbactam ("Unasyn") 3g IV Q6H (11/27-)  - C/W Vancomycin 1000mg IV Q12H (11/27-) (Vanc Trough After 4th Dose if still necessary) Thorough documentation of subjective included above. DDx includes infectious vs non-infectious swelling, including or excluding the implant he's reported to have had placed. Cultures of reported aspirate have been sent (though documentation of who collect or where the sample came from is as yet unclear). So far normal oral polo, which isn't too surprising.   Diagnostics  - Plastics C/S, Appreciate Recs (signed off)  - OMFS/Dental C/S s/p I&D & operation, f/u post-op recs PRN  [x] MR Facial Bones c Contrast: c/f ondotogenic origin, communicated with plastics attg who defers to OMFS; OMFS will evaluate    Therapeutics  - Pain Control: APAP 650mg PO Q6H PRN, Toradol 15mg IV PRN  - OR this morning with OMFS  - C/W Ampicillin/Sulbactam ("Unasyn") 3g IV Q6H (11/27-)  - C/W Vancomycin 1000mg IV Q12H (11/27-) (Vanc Trough After 4th Dose if still necessary)

## 2021-11-30 NOTE — BRIEF OPERATIVE NOTE - OPERATION/FINDINGS
Intraoral mucosal incision made from midline to left ramus. Subperiosteal dissection proceeded from midline to left posterior mandible exposing the foreign body which appeared as a mesh material possibly luis, polypropylene, or gore-bozena material spanning from midline to left posterior mandible up to ramus. Mesh was adherent to surrounding soft tissue with significant fibrous tissue. No ann purulence noted. Culture obtained. Foreign body sent as gross specimen.

## 2021-11-30 NOTE — PROGRESS NOTE ADULT - SUBJECTIVE AND OBJECTIVE BOX
52 Y/O M PMH Hypothyroidism, Hyperlipidemia states that he has been having L sided extraoral pain for the past 2 months. Pt states he went to [facility]. Pt states he did not have any intervention performed there. Pt states he was given Amoxicillin by his PMD which he has been taking without improvement. Pt denies any other sx or acute complaints. Pt was seen at [facility] [place] [facility], was given Clindamycin and had a CT performed, pt was transferred to [facility] on 11/27, and admitted to medicine.   Pt seen in ED by general dentistry who performed and I&D and placement of penrose drain. Hemopurulence expressed.   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     Interval hx:   - NPO overnight    OBJECTIVE:   ICU Vital Signs Last 24 Hrs  T(C): 36.6 (30 Nov 2021 06:37), Max: 37.1 (29 Nov 2021 21:30)  T(F): 97.8 (30 Nov 2021 06:37), Max: 98.7 (29 Nov 2021 21:30)  HR: 61 (30 Nov 2021 06:37) (59 - 67)  BP: 120/81 (30 Nov 2021 06:37) (120/81 - 135/78)  BP(mean): --  ABP: --  ABP(mean): --  RR: 16 (30 Nov 2021 06:37) (16 - 17)  SpO2: 99% (30 Nov 2021 06:37) (98% - 99%)    Limited physical exam:   Gen: AAOx4, NAD  EOE: moderate left anterior mandible swelling, TTP, warm to touch, nonerythematous, inferior border of the mandible is palpable throughout.   IOE: TAMMI ~40mm, occlusion stable, left vestibular swelling, penrose drain in place and sutured. TTP, no purulence noted.       CBC Full  -  ( 29 Nov 2021 05:21 )  WBC Count : 5.69 K/uL  RBC Count : 4.66 M/uL  Hemoglobin : 14.1 g/dL  Hematocrit : 42.0 %  Platelet Count - Automated : 209 K/uL  Mean Cell Volume : 90.1 fL  Mean Cell Hemoglobin : 30.3 pg  Mean Cell Hemoglobin Concentration : 33.6 gm/dL  Auto Neutrophil # : 3.38 K/uL  Auto Lymphocyte # : 1.48 K/uL  Auto Monocyte # : 0.55 K/uL  Auto Eosinophil # : 0.24 K/uL  Auto Basophil # : 0.03 K/uL  Auto Neutrophil % : 59.4 %  Auto Lymphocyte % : 26.0 %  Auto Monocyte % : 9.7 %  Auto Eosinophil % : 4.2 %  Auto Basophil % : 0.5 %    11-30    138  |  102  |  15  ----------------------------<  127<H>  4.2   |  27  |  0.82    Ca    8.9      30 Nov 2021 07:02  Phos  3.5     11-30  Mg     2.20     11-30    MEDICATIONS  (STANDING):  ampicillin/sulbactam  IVPB 3 Gram(s) IV Intermittent every 6 hours  atorvastatin 10 milliGRAM(s) Oral at bedtime  dextrose 5% + sodium chloride 0.45%. 1000 milliLiter(s) (100 mL/Hr) IV Continuous <Continuous>  levothyroxine 75 MICROGram(s) Oral daily  vancomycin  IVPB 1000 milliGRAM(s) IV Intermittent every 12 hours    MEDICATIONS  (PRN):  acetaminophen     Tablet .. 975 milliGRAM(s) Oral every 6 hours PRN Temp greater or equal to 38C (100.4F), Mild Pain (1 - 3), Moderate Pain (4 - 6), Severe Pain (7 - 10)

## 2021-11-30 NOTE — PROGRESS NOTE ADULT - PROBLEM SELECTOR PLAN 5
Hospital Bundle  Fluids: PO ad margareth (until NPO)  Electrolytes: Replete K > 4, Mg > 2, Phos > 3  Nutrition: Diet DASH, NPO @ MN for OR (per OMFS)  PPX  ---VTE: SCDs, ambulate  ---GI: N/A  ---Resp: N/A  Access: PIV  Code Status: FULL CODE  Dispo: Pending surgical management, likely to home when cleared

## 2021-11-30 NOTE — CONSULT NOTE ADULT - ATTENDING COMMENTS
Patient evaluated in PACU post intraoral removal of infected foreign body.  Cultures taken.   Chin prosthesis infection.  Await OR cultures.  Agree with vanco and unasyn for now.    will follow.    Jenniffer Crow MD  Pager: [phone]  After 5 PM or weekends please call fellow on call or office [phone]

## 2021-11-30 NOTE — PROGRESS NOTE ADULT - SUBJECTIVE AND OBJECTIVE BOX
Charlie Yun M.D.  [facility] PGY-1    PROGRESS NOTE:     Patient is a 51y old  Male who presents with a chief complaint of Jaw Swelling (30 Nov 2021 08:01)      -OVERNIGHT EVENTS-      -SUBJECTIVE-      MEDICATIONS  (STANDING):  ampicillin/sulbactam  IVPB 3 Gram(s) IV Intermittent every 6 hours  atorvastatin 10 milliGRAM(s) Oral at bedtime  chlorhexidine 0.12% Liquid 15 milliLiter(s) Swish and Spit two times a day  dextrose 5% + sodium chloride 0.45%. 1000 milliLiter(s) (100 mL/Hr) IV Continuous <Continuous>  levothyroxine 75 MICROGram(s) Oral daily  vancomycin  IVPB 1000 milliGRAM(s) IV Intermittent every 12 hours    MEDICATIONS  (PRN):  acetaminophen     Tablet .. 975 milliGRAM(s) Oral every 6 hours PRN Temp greater or equal to 38C (100.4F), Mild Pain (1 - 3), Moderate Pain (4 - 6), Severe Pain (7 - 10)      -OBJECTIVE-    CAPILLARY BLOOD GLUCOSE        I&O's Summary    29 Nov 2021 07:01  -  30 Nov 2021 07:00  --------------------------------------------------------  IN: 340 mL / OUT: 0 mL / NET: 340 mL        VITAL SIGNS  Vital Signs Last 24 Hrs  T(C): 36.6 (30 Nov 2021 06:37), Max: 37.1 (29 Nov 2021 21:30)  T(F): 97.8 (30 Nov 2021 06:37), Max: 98.7 (29 Nov 2021 21:30)  HR: 61 (30 Nov 2021 06:37) (59 - 67)  BP: 120/81 (30 Nov 2021 06:37) (120/81 - 135/78)  BP(mean): --  RR: 16 (30 Nov 2021 06:37) (16 - 17)  SpO2: 99% (30 Nov 2021 06:37) (98% - 99%)    PHYSICAL EXAM:  GENERAL/CONSTITUTIONAL: NAD, Awake, AOx3 (person, place, time)  HEENT: NCAT  ---EYES: no scleral icterus, EOMI, PERRLA  ---ENMT: MMM,   ---NECK: No palpable masses; no thyromegaly  CARDIO: RRR. Normal S1/S2, no m/r/g.  RESP: Normal respiratory effort; CTAB, no w/r/r  ABD: Soft, NTND; +BS.   : No CVAT.   MSK: No obvious deformity or ROM deficit.  ---EXTREMITIES: No peripheral edema. Peripheral pulses 2+ x4.  SKIN: Warm, dry. No rashes.   NEURO: Moves all four extremities spontaneously  PSYCH: Appropriate mood & affect. No VH/AH. No SI/HI.    LABS:                        14.1   5.69  )-----------( 209      ( 29 Nov 2021 05:21 )             42.0     11-30    138  |  102  |  15  ----------------------------<  127<H>  4.2   |  27  |  0.82    Ca    8.9      30 Nov 2021 07:02  Phos  3.5     11-30  Mg     2.20     11-30    MICROBIOLOGY    Culture - Fungal, Other (collected 27 Nov 2021 18:18)  Source: .Other mouth  Preliminary Report (29 Nov 2021 11:58):    Testing in progress    Culture - Acid Fast - Other w/Smear (collected 27 Nov 2021 18:17)  Source: .Other Other, Oral    Culture - Abscess with Gram Stain (collected 27 Nov 2021 16:11)  Source: .Abscess Mouth  Final Report (29 Nov 2021 17:45):    Normal mouth polo isolated    IMAGING  < from: MR Facial Bones Only w/ IV Cont (11.28.21 @ 12:30) >  FINDINGS:    SOFT TISSUES: Redemonstration, 3.8 x 1.7 x 1.9 cm AP, TR, CC rim-enhancing central nonenhancing lesion adjacent to the left lateral mandibular body concerning abscess with restricted diffusion, the right hyperintensity (14:60, 15:10), as seen on CT, with 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 Redemonstration loss of subcutaneous soft tissue planes, fat stranding, enhancement and phlegmon adjacent to the abscess extending to the left submental,  spaces with enhancement extending to the left retromolar trigone,  involving the left buccinator muscle and insertion of the left parotid Stensen's duct, (30:39 with loss of fat planes adjacent to the left lateral subcutaneous mandibular gland, and inferiorly along the platysma to the level of the left thyroid cartilage with phlegmon inflammatory change and loss of the normal skin and subcutaneous soft tissue planes along the left submental region.    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    SALIVARY GLANDS: Infectious inflammatory change along the left buccinator muscle also involves the left parotid Stensen's duct insertion (30:33, there is infectious inflammatory change with increased enhancement noted along theleft submandibular and sublingual glands.    AERODIGESTIVE TRACT: Likely ongoing dental disease with heterogeneity and irregular enhancement involving the left greater than right mandibular and maxillary molar and premolar teeth loss of subcutaneous soft tissue planes as noted above along the left anterior mandibular body phlegmon and abscess formation.    Remaining aerodigestive tract is unremarkable.    LYMPH NODES:  Multiple small lymph nodes cervical chains likely reactive..    SINONASAL CAVITY: Minimal mucosal thickening within the maxillary ethmoid and frontal sinuses with hypoplastic sphenoid sinuses, hypoplastic sphenoid can be seen with cystic fibrosis correlate clinically.    MASTOIDS: Well-pneumatized and clear.  MISCELLANEOUS: Brain parenchyma and orbits remain unchanged, no acute hemorrhage or midline shift, basal cisterns are patent.    IMPRESSION:  Redemonstration, 3.8 x 1.7 x 1.9 cm AP, TR, CC rim-enhancing central nonenhancing lesion adjacent to the left lateral mandibular body likely abscess, and superinfection  chin prosthesis, with r phlegmon, left submental inflammatory change, likely odontogenic origin. Redemonstration unerupted bilateral first mandibular molar teeth, irregular decreased T1 signal enhancement with corresponding lucency and periarticular lucency along left greater than right maxillary molar and premolar teeth involving the left retromolar trigone and left buccinator muscle with extension to the left parotid Stensen's duct insertion (30:7).    < end of copied text >        COORDINATION OF CARE:  ---PCP: Taisha Godoy ([facility])  ---Consultants: OMFS, Dental, Plastics (signed off), --> ID post-op for ABx recommendations   Charlie Yun M.D.  [facility] PGY-1    PROGRESS NOTE:     Patient is a 51y old  Male who presents with a chief complaint of Jaw Swelling (30 Nov 2021 08:01)      -OVERNIGHT EVENTS-  NAEON, planned for OR this morning for implant removal    -SUBJECTIVE-  ***Mr. Fulton is seen after his surgery.    After his mesh removal, he says he has significant pain but that it's tolerable with the pain regimen prescribed. He says that he was able to eat some soft food since the surgery without too much effort.    MEDICATIONS  (STANDING):  ampicillin/sulbactam  IVPB 3 Gram(s) IV Intermittent every 6 hours  atorvastatin 10 milliGRAM(s) Oral at bedtime  chlorhexidine 0.12% Liquid 15 milliLiter(s) Swish and Spit two times a day  dextrose 5% + sodium chloride 0.45%. 1000 milliLiter(s) (100 mL/Hr) IV Continuous <Continuous>  levothyroxine 75 MICROGram(s) Oral daily  vancomycin  IVPB 1000 milliGRAM(s) IV Intermittent every 12 hours    MEDICATIONS  (PRN):  acetaminophen     Tablet .. 975 milliGRAM(s) Oral every 6 hours PRN Temp greater or equal to 38C (100.4F), Mild Pain (1 - 3), Moderate Pain (4 - 6), Severe Pain (7 - 10)      -OBJECTIVE-    CAPILLARY BLOOD GLUCOSE        I&O's Summary    29 Nov 2021 07:01  -  30 Nov 2021 07:00  --------------------------------------------------------  IN: 340 mL / OUT: 0 mL / NET: 340 mL        VITAL SIGNS  Vital Signs Last 24 Hrs  T(C): 36.6 (30 Nov 2021 06:37), Max: 37.1 (29 Nov 2021 21:30)  T(F): 97.8 (30 Nov 2021 06:37), Max: 98.7 (29 Nov 2021 21:30)  HR: 61 (30 Nov 2021 06:37) (59 - 67)  BP: 120/81 (30 Nov 2021 06:37) (120/81 - 135/78)  BP(mean): --  RR: 16 (30 Nov 2021 06:37) (16 - 17)  SpO2: 99% (30 Nov 2021 06:37) (98% - 99%)    PHYSICAL EXAM:  GENERAL/CONSTITUTIONAL: NAD, Awake, AOx3 (person, place, time)  HEENT: NCAT  ---EYES: no scleral icterus, EOMI, PERRLA  ---ENMT: MMM, sutures in place, superficial erythema resolved, extra oral swelling significantly reduced  ---NECK: No palpable masses; no thyromegaly  CARDIO: RRR.  RESP: Normal respiratory effort;   ABD: Soft, NTND  : No CVAT.   MSK: No obvious deformity or ROM deficit.  ---EXTREMITIES: No peripheral edema. Peripheral pulses 2+ x4.  SKIN: Warm, dry. No rashes.   NEURO: Moves all four extremities spontaneously  PSYCH: Appropriate mood & affect.     LABS:                        14.1   5.69  )-----------( 209      ( 29 Nov 2021 05:21 )             42.0     11-30    138  |  102  |  15  ----------------------------<  127<H>  4.2   |  27  |  0.82    Ca    8.9      30 Nov 2021 07:02  Phos  3.5     11-30  Mg     2.20     11-30    MICROBIOLOGY    Culture - Fungal, Other (collected 27 Nov 2021 18:18)  Source: .Other mouth  Preliminary Report (29 Nov 2021 11:58):    Testing in progress    Culture - Acid Fast - Other w/Smear (collected 27 Nov 2021 18:17)  Source: .Other Other, Oral    Culture - Abscess with Gram Stain (collected 27 Nov 2021 16:11)  Source: .Abscess Mouth  Final Report (29 Nov 2021 17:45):    Normal mouth polo isolated    IMAGING  < from: MR Facial Bones Only w/ IV Cont (11.28.21 @ 12:30) >  FINDINGS:    SOFT TISSUES: Redemonstration, 3.8 x 1.7 x 1.9 cm AP, TR, CC rim-enhancing central nonenhancing lesion adjacent to the left lateral mandibular body concerning abscess with restricted diffusion, the right hyperintensity (14:60, 15:10), as seen on CT, with 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 Redemonstration loss of subcutaneous soft tissue planes, fat stranding, enhancement and phlegmon adjacent to the abscess extending to the left submental,  spaces with enhancement extending to the left retromolar trigone,  involving the left buccinator muscle and insertion of the left parotid Stensen's duct, (30:39 with loss of fat planes adjacent to the left lateral subcutaneous mandibular gland, and inferiorly along the platysma to the level of the left thyroid cartilage with phlegmon inflammatory change and loss of the normal skin and subcutaneous soft tissue planes along the left submental region.    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    SALIVARY GLANDS: Infectious inflammatory change along the left buccinator muscle also involves the left parotid Stensen's duct insertion (30:33, there is infectious inflammatory change with increased enhancement noted along theleft submandibular and sublingual glands.    AERODIGESTIVE TRACT: Likely ongoing dental disease with heterogeneity and irregular enhancement involving the left greater than right mandibular and maxillary molar and premolar teeth loss of subcutaneous soft tissue planes as noted above along the left anterior mandibular body phlegmon and abscess formation.    Remaining aerodigestive tract is unremarkable.    LYMPH NODES:  Multiple small lymph nodes cervical chains likely reactive..    SINONASAL CAVITY: Minimal mucosal thickening within the maxillary ethmoid and frontal sinuses with hypoplastic sphenoid sinuses, hypoplastic sphenoid can be seen with cystic fibrosis correlate clinically.    MASTOIDS: Well-pneumatized and clear.  MISCELLANEOUS: Brain parenchyma and orbits remain unchanged, no acute hemorrhage or midline shift, basal cisterns are patent.    IMPRESSION:  Redemonstration, 3.8 x 1.7 x 1.9 cm AP, TR, CC rim-enhancing central nonenhancing lesion adjacent to the left lateral mandibular body likely abscess, and superinfection  chin prosthesis, with r phlegmon, left submental inflammatory change, likely odontogenic origin. Redemonstration unerupted bilateral first mandibular molar teeth, irregular decreased T1 signal enhancement with corresponding lucency and periarticular lucency along left greater than right maxillary molar and premolar teeth involving the left retromolar trigone and left buccinator muscle with extension to the left parotid Stensen's duct insertion (30:7).    < end of copied text >        COORDINATION OF CARE:  ---PCP: Taisha Godoy ([facility])  ---Consultants: OMFS, Dental, Plastics (signed off), --> ID post-op for ABx recommendations

## 2021-11-30 NOTE — CONSULT NOTE ADULT - ASSESSMENT
52yo M with hx of hypothyroidism, HLD and prior mandibular implant presenting as transfer from [facility] for evaluation of facial swelling, imaging findings suspicious for mandibular abscess with extension into mandibular implant.    //L. Mandibular abscess with extension into mandibular implant  -For OR today for source control and removal of implant  -I&D cultures showing normal mouth polo  -Agree with IV amp-sulbactam, would stop Vancomycin  -F/U OR cultures  -Antibiotic duration determined by OR findings    Pending d/w attending, Dr. Brown     52yo M with hx of hypothyroidism, HLD and prior mandibular implant presenting as transfer from [facility] for evaluation of facial swelling, imaging findings suspicious for mandibular abscess with extension into mandibular implant.    //L. Mandibular abscess with extension into mandibular implant  -S/p OR today with removal of mesh, unclear if entire prosthetic material was removed  -I&D cultures 11/27 showing normal mouth polo  -Agree with IV amp-sulbactam, and vancomycin  -F/U OR cultures  -Antibiotic duration determined by OR findings    D/w Attending Dr. Crow

## 2021-11-30 NOTE — PROGRESS NOTE ADULT - ASSESSMENT
51M w/ L facial swelling a/w tooth #18 and mandibular implant. Plan for removal of mandibular implant and extraction of tooth in OR. Pt added-on for OR tomorrow (11/30).    - IV abx   - analgesia prn  - HOB elevated  - Peridex BID  - NPO at midnight  - Pt going to OR today for 8:30 start.

## 2021-11-30 NOTE — PROGRESS NOTE ADULT - PROBLEM SELECTOR PLAN 2
H/O Hypothyroidism. TSH slightly elevated, T4 WNL. Likely 2/2 active stressors, but will communicate results in discharge summary.  - C/W Home Levothyroxine 75ug

## 2021-11-30 NOTE — PACU DISCHARGE NOTE - MENTAL STATUS: PATIENT PARTICIPATION
Awake Airway patent, TM normal bilaterally, normal appearing mouth, nose, throat, neck supple with full range of motion. Airway patent, TM normal bilaterally, normal appearing mouth, nose, white spots in oropharynx, neck supple with full range of motion.

## 2021-12-01 LAB
ANION GAP SERPL CALC-SCNC: 9 MMOL/L — SIGNIFICANT CHANGE UP (ref 7–14)
BASOPHILS # BLD AUTO: 0.03 K/UL — SIGNIFICANT CHANGE UP (ref 0–0.2)
BASOPHILS NFR BLD AUTO: 0.3 % — SIGNIFICANT CHANGE UP (ref 0–2)
BUN SERPL-MCNC: 12 MG/DL — SIGNIFICANT CHANGE UP (ref 7–23)
CALCIUM SERPL-MCNC: 9 MG/DL — SIGNIFICANT CHANGE UP (ref 8.4–10.5)
CHLORIDE SERPL-SCNC: 100 MMOL/L — SIGNIFICANT CHANGE UP (ref 98–107)
CO2 SERPL-SCNC: 26 MMOL/L — SIGNIFICANT CHANGE UP (ref 22–31)
CREAT SERPL-MCNC: 0.8 MG/DL — SIGNIFICANT CHANGE UP (ref 0.5–1.3)
EOSINOPHIL # BLD AUTO: 0.07 K/UL — SIGNIFICANT CHANGE UP (ref 0–0.5)
EOSINOPHIL NFR BLD AUTO: 0.6 % — SIGNIFICANT CHANGE UP (ref 0–6)
GLUCOSE SERPL-MCNC: 103 MG/DL — HIGH (ref 70–99)
HCT VFR BLD CALC: 40.3 % — SIGNIFICANT CHANGE UP (ref 39–50)
HGB BLD-MCNC: 14.2 G/DL — SIGNIFICANT CHANGE UP (ref 13–17)
IANC: 7.85 K/UL — SIGNIFICANT CHANGE UP (ref 1.5–8.5)
IMM GRANULOCYTES NFR BLD AUTO: 0.5 % — SIGNIFICANT CHANGE UP (ref 0–1.5)
LYMPHOCYTES # BLD AUTO: 19.6 % — SIGNIFICANT CHANGE UP (ref 13–44)
LYMPHOCYTES # BLD AUTO: 2.11 K/UL — SIGNIFICANT CHANGE UP (ref 1–3.3)
MAGNESIUM SERPL-MCNC: 2.2 MG/DL — SIGNIFICANT CHANGE UP (ref 1.6–2.6)
MCHC RBC-ENTMCNC: 31.1 PG — SIGNIFICANT CHANGE UP (ref 27–34)
MCHC RBC-ENTMCNC: 35.2 GM/DL — SIGNIFICANT CHANGE UP (ref 32–36)
MCV RBC AUTO: 88.2 FL — SIGNIFICANT CHANGE UP (ref 80–100)
MONOCYTES # BLD AUTO: 0.68 K/UL — SIGNIFICANT CHANGE UP (ref 0–0.9)
MONOCYTES NFR BLD AUTO: 6.3 % — SIGNIFICANT CHANGE UP (ref 2–14)
NEUTROPHILS # BLD AUTO: 7.85 K/UL — HIGH (ref 1.8–7.4)
NEUTROPHILS NFR BLD AUTO: 72.7 % — SIGNIFICANT CHANGE UP (ref 43–77)
NRBC # BLD: 0 /100 WBCS — SIGNIFICANT CHANGE UP
NRBC # FLD: 0 K/UL — SIGNIFICANT CHANGE UP
PHOSPHATE SERPL-MCNC: 2.9 MG/DL — SIGNIFICANT CHANGE UP (ref 2.5–4.5)
PLATELET # BLD AUTO: 247 K/UL — SIGNIFICANT CHANGE UP (ref 150–400)
POTASSIUM SERPL-MCNC: 4 MMOL/L — SIGNIFICANT CHANGE UP (ref 3.5–5.3)
POTASSIUM SERPL-SCNC: 4 MMOL/L — SIGNIFICANT CHANGE UP (ref 3.5–5.3)
RBC # BLD: 4.57 M/UL — SIGNIFICANT CHANGE UP (ref 4.2–5.8)
RBC # FLD: 11.7 % — SIGNIFICANT CHANGE UP (ref 10.3–14.5)
SODIUM SERPL-SCNC: 135 MMOL/L — SIGNIFICANT CHANGE UP (ref 135–145)
VANCOMYCIN TROUGH SERPL-MCNC: 7.9 UG/ML — LOW (ref 10–20)
WBC # BLD: 10.79 K/UL — HIGH (ref 3.8–10.5)
WBC # FLD AUTO: 10.79 K/UL — HIGH (ref 3.8–10.5)

## 2021-12-01 PROCEDURE — 99233 SBSQ HOSP IP/OBS HIGH 50: CPT | Mod: GC

## 2021-12-01 PROCEDURE — 99232 SBSQ HOSP IP/OBS MODERATE 35: CPT

## 2021-12-01 RX ORDER — VANCOMYCIN HCL 1 G
1250 VIAL (EA) INTRAVENOUS EVERY 12 HOURS
Refills: 0 | Status: DISCONTINUED | OUTPATIENT
Start: 2021-12-01 | End: 2021-12-01

## 2021-12-01 RX ORDER — VANCOMYCIN HCL 1 G
1250 VIAL (EA) INTRAVENOUS ONCE
Refills: 0 | Status: COMPLETED | OUTPATIENT
Start: 2021-12-01 | End: 2021-12-01

## 2021-12-01 RX ORDER — VANCOMYCIN HCL 1 G
VIAL (EA) INTRAVENOUS
Refills: 0 | Status: DISCONTINUED | OUTPATIENT
Start: 2021-12-01 | End: 2021-12-01

## 2021-12-01 RX ADMIN — CHLORHEXIDINE GLUCONATE 15 MILLILITER(S): 213 SOLUTION TOPICAL at 07:03

## 2021-12-01 RX ADMIN — CHLORHEXIDINE GLUCONATE 15 MILLILITER(S): 213 SOLUTION TOPICAL at 18:00

## 2021-12-01 RX ADMIN — AMPICILLIN SODIUM AND SULBACTAM SODIUM 200 GRAM(S): 250; 125 INJECTION, POWDER, FOR SUSPENSION INTRAMUSCULAR; INTRAVENOUS at 01:45

## 2021-12-01 RX ADMIN — AMPICILLIN SODIUM AND SULBACTAM SODIUM 200 GRAM(S): 250; 125 INJECTION, POWDER, FOR SUSPENSION INTRAMUSCULAR; INTRAVENOUS at 07:23

## 2021-12-01 RX ADMIN — AMPICILLIN SODIUM AND SULBACTAM SODIUM 200 GRAM(S): 250; 125 INJECTION, POWDER, FOR SUSPENSION INTRAMUSCULAR; INTRAVENOUS at 13:32

## 2021-12-01 RX ADMIN — Medication 166.67 MILLIGRAM(S): at 18:00

## 2021-12-01 RX ADMIN — Medication 75 MICROGRAM(S): at 07:03

## 2021-12-01 RX ADMIN — AMPICILLIN SODIUM AND SULBACTAM SODIUM 200 GRAM(S): 250; 125 INJECTION, POWDER, FOR SUSPENSION INTRAMUSCULAR; INTRAVENOUS at 19:02

## 2021-12-01 RX ADMIN — ATORVASTATIN CALCIUM 10 MILLIGRAM(S): 80 TABLET, FILM COATED ORAL at 22:48

## 2021-12-01 RX ADMIN — Medication 166.67 MILLIGRAM(S): at 08:34

## 2021-12-01 NOTE — PROGRESS NOTE ADULT - SUBJECTIVE AND OBJECTIVE BOX
ANESTHESIA POSTOP CHECK    51y Male POSTOP DAY 1      Vital Signs Last 24 Hrs  T(C): 36.9 (01 Dec 2021 06:58), Max: 36.9 (30 Nov 2021 13:00)  T(F): 98.4 (01 Dec 2021 06:58), Max: 98.5 (30 Nov 2021 13:00)  HR: 73 (01 Dec 2021 06:58) (68 - 82)  BP: 114/74 (01 Dec 2021 06:58) (114/68 - 136/79)  RR: 16 (01 Dec 2021 06:58) (16 - 18)  SpO2: 100% (01 Dec 2021 06:58) (98% - 100%)        [x ] NO APPARENT ANESTHESIA COMPLICATIONS      Comments:

## 2021-12-01 NOTE — PROGRESS NOTE ADULT - PROBLEM SELECTOR PLAN 1
Thorough documentation of subjective included above. DDx includes infectious vs non-infectious swelling, including or excluding the implant he's reported to have had placed. Cultures of reported aspirate have been sent (though documentation of who collect or where the sample came from is as yet unclear). So far normal oral polo, which isn't too surprising.   Diagnostics  - Plastics C/S, Appreciate Recs (signed off)  - OMFS/Dental C/S s/p I&D & operation, f/u post-op recs PRN  [x] MR Facial Bones c Contrast: c/f ondotogenic origin, communicated with plastics attg who defers to OMFS; OMFS will evaluate    Therapeutics  - Pain Control: APAP 650mg PO Q6H PRN, Toradol 15mg IV PRN  - OR this morning with OMFS  - C/W Ampicillin/Sulbactam ("Unasyn") 3g IV Q6H (11/27-)  - C/W Vancomycin 1000mg IV Q12H (11/27-) (Vanc Trough After 4th Dose if still necessary) Thorough documentation of subjective included above. DDx includes infectious vs non-infectious swelling, including or excluding the implant he's reported to have had placed. Cultures of reported aspirate have been sent (though documentation of who collect or where the sample came from is as yet unclear). Now with GPRs. Unclear if still normal polo or a more indolent pathogen, will await speciation for ongoing management.  Diagnostics  - Plastics C/S, Appreciate Recs (signed off)  - OMFS/Dental C/S s/p I&D & operation; plan for tooth care as OP with dental  [x] MR Facial Bones c Contrast: c/f ondotogenic origin, communicated with plastics attg who defers to OMFS; OMFS will evaluate    Therapeutics  - Pain Control: APAP 650mg PO Q6H PRN, Toradol 15mg IV PRN  - C/W Ampicillin/Sulbactam ("Unasyn") 3g IV Q6H (11/27-)  - C/W Vancomycin 1000mg IV Q12H (11/27-) (Vanc Trough After 4th Dose if still necessary)

## 2021-12-01 NOTE — PROGRESS NOTE ADULT - PROBLEM SELECTOR PLAN 5
Hospital Bundle  Fluids: PO ad margareth (until NPO)  Electrolytes: Replete K > 4, Mg > 2, Phos > 3  Nutrition: Diet DASH, NPO @ MN for OR (per OMFS)  PPX  ---VTE: SCDs, ambulate  ---GI: N/A  ---Resp: N/A  Access: PIV  Code Status: FULL CODE  Dispo: Pending surgical management, likely to home when cleared Hospital Bundle  Fluids: PO ad margareth  Electrolytes: Replete K > 4, Mg > 2, Phos > 3  Nutrition: Diet DASH  PPX  ---VTE: SCDs, ambulate  ---GI: N/A  ---Resp: N/A  Access: PIV  Code Status: FULL CODE  Dispo: Pending surgical management, likely to home when cleared from ID perspective

## 2021-12-01 NOTE — PROGRESS NOTE ADULT - SUBJECTIVE AND OBJECTIVE BOX
Follow Up:  mandible infection, s/p removal of infected chin prosthesis    Interval History/ROS:  feels much better.  has some pain left mandible.    Allergies  No Known Allergies        ANTIMICROBIALS:  ampicillin/sulbactam  IVPB 3 every 6 hours  vancomycin  IVPB 1250 every 12 hours  vancomycin  IVPB        OTHER MEDS:  acetaminophen     Tablet .. 650 milliGRAM(s) Oral every 6 hours PRN  atorvastatin 10 milliGRAM(s) Oral at bedtime  chlorhexidine 0.12% Liquid 15 milliLiter(s) Swish and Spit two times a day  ketorolac   Injectable 15 milliGRAM(s) IV Push every 6 hours PRN  levothyroxine 75 MICROGram(s) Oral daily  oxyCODONE    IR 5 milliGRAM(s) Oral every 6 hours PRN      Vital Signs Last 24 Hrs  T(C): 37.3 (01 Dec 2021 14:05), Max: 37.3 (01 Dec 2021 14:05)  T(F): 99.2 (01 Dec 2021 14:05), Max: 99.2 (01 Dec 2021 14:05)  HR: 64 (01 Dec 2021 14:05) (64 - 73)  BP: 111/71 (01 Dec 2021 14:05) (111/71 - 114/74)  BP(mean): --  RR: 17 (01 Dec 2021 14:05) (16 - 17)  SpO2: 97% (01 Dec 2021 14:05) (97% - 100%)    PHYSICAL EXAM:  Constitutional: Not in acute distress  Eyes: No icterus.  Oral cavity: swelling left mandible  Neck: Supple  RS: Chest clear to auscultation bilaterally. No wheeze/rhonchi/crepitations.  CVS: S1, S2 heard. Regular rate and rhythm. No murmurs/rubs/gallops.  Abdomen: Soft. No guarding/rigidity/tenderness.  Extremities: Warm. No pedal edema  Skin: No lesions noted  Vascular: No evidence of phlebitis  Neuro: Alert, oriented to time/place/person  Cranial nerves 2-12 grossly normal. No focal abnormalities                          14.2   10.79 )-----------( 247      ( 01 Dec 2021 08:03 )             40.3       12-01    135  |  100  |  12  ----------------------------<  103<H>  4.0   |  26  |  0.80    Ca    9.0      01 Dec 2021 08:02  Phos  2.9     12-01  Mg     2.20     12-01            MICROBIOLOGY:  Vancomycin Level, Trough: 7.9 ug/mL (12-01-21 @ 05:19)  v  .Smear left mandibular  11-30-21   Testing in progress  --    No polymorphonuclear leukocytes per low power field  Moderate Gram positive cocci in pairs per oil power field  Few Gram Positive Rods per oil power field      .Abscess left mandibular abscess  11-30-21   Rare Gram positive organisms  --  --      .Other mouth  11-27-21   Testing in progress  --  --      .Other Other, Oral  11-27-21   Culture is being performed.  --  --      .Abscess Mouth  11-27-21   Normal mouth polo isolated  --  --          Rapid RVP Result: Yamileth (11-27 @ 02:25)        RADIOLOGY:

## 2021-12-01 NOTE — PROGRESS NOTE ADULT - PROBLEM SELECTOR PLAN 2
H/O Hypothyroidism. TSH slightly elevated, T4 WNL. Likely 2/2 active stressors, but will communicate results in discharge summary.  - C/W Home Levothyroxine 75ug H/O Hypothyroidism. TSH slightly elevated, T4 WNL. Likely 2/2 active stressors, but will communicate results in discharge summary.  - C/W Home Levothyroxine 75ug PO QD

## 2021-12-01 NOTE — PROGRESS NOTE ADULT - ASSESSMENT
Mr. Fulton is a 51M with h/o Hypothyroidism, HLD who is admitted for evaluation and management of a left-sided mandibular mass with imaging findings concerning for abscess (either odontologic or delayed foreign-body in origin) or non-infectious process (i.e. sarcoma vs other connective tissue mass) and who remains hemodynamically stable while receiving IV ABx and awaiting further characterization with MRI. Mr. Fulton is a 51M with h/o Hypothyroidism, HLD who is admitted for evaluation and management of a left-sided mandibular mass with imaging findings concerning for abscess (either odontologic or delayed foreign-body in origin) or non-infectious process (i.e. sarcoma vs other connective tissue mass) and who remains hemodynamically stable while receiving IV ABx, s/p surgical intervention 11/30/2021 with plan for optimization of ABx regimen after culture results.

## 2021-12-01 NOTE — PROGRESS NOTE ADULT - SUBJECTIVE AND OBJECTIVE BOX
50 Y/O M PMH Hypothyroidism, Hyperlipidemia states that he has been having L sided extraoral pain for the past 2 months. Pt states he went to [facility]. Pt states he did not have any intervention performed there. Pt states he was given Amoxicillin by his PMD which he has been taking without improvement. Pt denies any other sx or acute complaints. [place] [facility] [place], was given Clindamycin and had a CT performed, pt was transferred to [facility] on 11/27, and admitted to medicine.   Pt seen in ED by general dentistry who performed and I&D and placement of penrose drain. Hemopurulence expressed.   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     Subjective: Now s/p removal of infected chin mesh (11/30)     Interval hx:   - NAEON     OBJECTIVE:   ICU Vital Signs Last 24 Hrs  T(C): 36.6 (30 Nov 2021 06:37), Max: 37.1 (29 Nov 2021 21:30)  T(F): 97.8 (30 Nov 2021 06:37), Max: 98.7 (29 Nov 2021 21:30)  HR: 61 (30 Nov 2021 06:37) (59 - 67)  BP: 120/81 (30 Nov 2021 06:37) (120/81 - 135/78)  BP(mean): --  ABP: --  ABP(mean): --  RR: 16 (30 Nov 2021 06:37) (16 - 17)  SpO2: 99% (30 Nov 2021 06:37) (98% - 99%)    Limited physical exam:   Gen: AAOx4, NAD  EOE: improved left anterior mandible swelling, TTP,  nonerythematous, inferior border of the mandible is palpable throughout.   IOE: TAMMI ~40mm, occlusion stable, left vestibular swelling improved. Incision hemostatic, sutures c/i.       CBC Full  -  ( 29 Nov 2021 05:21 )  WBC Count : 5.69 K/uL  RBC Count : 4.66 M/uL  Hemoglobin : 14.1 g/dL  Hematocrit : 42.0 %  Platelet Count - Automated : 209 K/uL  Mean Cell Volume : 90.1 fL  Mean Cell Hemoglobin : 30.3 pg  Mean Cell Hemoglobin Concentration : 33.6 gm/dL  Auto Neutrophil # : 3.38 K/uL  Auto Lymphocyte # : 1.48 K/uL  Auto Monocyte # : 0.55 K/uL  Auto Eosinophil # : 0.24 K/uL  Auto Basophil # : 0.03 K/uL  Auto Neutrophil % : 59.4 %  Auto Lymphocyte % : 26.0 %  Auto Monocyte % : 9.7 %  Auto Eosinophil % : 4.2 %  Auto Basophil % : 0.5 %    11-30    138  |  102  |  15  ----------------------------<  127<H>  4.2   |  27  |  0.82    Ca    8.9      30 Nov 2021 07:02  Phos  3.5     11-30  Mg     2.20     11-30    MEDICATIONS  (STANDING):  ampicillin/sulbactam  IVPB 3 Gram(s) IV Intermittent every 6 hours  atorvastatin 10 milliGRAM(s) Oral at bedtime  dextrose 5% + sodium chloride 0.45%. 1000 milliLiter(s) (100 mL/Hr) IV Continuous <Continuous>  levothyroxine 75 MICROGram(s) Oral daily  vancomycin  IVPB 1000 milliGRAM(s) IV Intermittent every 12 hours    MEDICATIONS  (PRN):  acetaminophen     Tablet .. 975 milliGRAM(s) Oral every 6 hours PRN Temp greater or equal to 38C (100.4F), Mild Pain (1 - 3), Moderate Pain (4 - 6), Severe Pain (7 - 10)

## 2021-12-01 NOTE — PROGRESS NOTE ADULT - SUBJECTIVE AND OBJECTIVE BOX
Charlie Yun M.D.  [facility] PGY-1    PROGRESS NOTE:     Patient is a 51y old  Male who presents with a chief complaint of Jaw Swelling (30 Nov 2021 10:28)      -OVERNIGHT EVENTS-      -SUBJECTIVE-      MEDICATIONS  (STANDING):  ampicillin/sulbactam  IVPB 3 Gram(s) IV Intermittent every 6 hours  atorvastatin 10 milliGRAM(s) Oral at bedtime  chlorhexidine 0.12% Liquid 15 milliLiter(s) Swish and Spit two times a day  levothyroxine 75 MICROGram(s) Oral daily  vancomycin  IVPB      vancomycin  IVPB 1250 milliGRAM(s) IV Intermittent once  vancomycin  IVPB 1250 milliGRAM(s) IV Intermittent every 12 hours    MEDICATIONS  (PRN):  acetaminophen     Tablet .. 650 milliGRAM(s) Oral every 6 hours PRN Mild Pain (1 - 3)  ketorolac   Injectable 15 milliGRAM(s) IV Push every 6 hours PRN Moderate Pain (4 - 6)  oxyCODONE    IR 5 milliGRAM(s) Oral every 6 hours PRN Severe Pain (7 - 10)      -OBJECTIVE-    CAPILLARY BLOOD GLUCOSE        I&O's Summary    30 Nov 2021 07:01  -  01 Dec 2021 07:00  --------------------------------------------------------  IN: 670 mL / OUT: 0 mL / NET: 670 mL        VITAL SIGNS  Vital Signs Last 24 Hrs  T(C): 36.9 (01 Dec 2021 06:58), Max: 36.9 (30 Nov 2021 13:00)  T(F): 98.4 (01 Dec 2021 06:58), Max: 98.5 (30 Nov 2021 13:00)  HR: 73 (01 Dec 2021 06:58) (68 - 82)  BP: 114/74 (01 Dec 2021 06:58) (114/68 - 136/79)  BP(mean): --  RR: 16 (01 Dec 2021 06:58) (16 - 18)  SpO2: 100% (01 Dec 2021 06:58) (98% - 100%)    PHYSICAL EXAM:  GENERAL/CONSTITUTIONAL: NAD, Awake, AOx3 (person, place, time)  HEENT: NCAT  ---EYES: no scleral icterus, EOMI, PERRLA  ---ENMT: MMM,   ---NECK: No palpable masses; no thyromegaly  CARDIO: RRR. Normal S1/S2, no m/r/g.  RESP: Normal respiratory effort; CTAB, no w/r/r  ABD: Soft, NTND; +BS.   : No CVAT.   MSK: No obvious deformity or ROM deficit.  ---EXTREMITIES: No peripheral edema. Peripheral pulses 2+ x4.  SKIN: Warm, dry. No rashes.   NEURO: Moves all four extremities spontaneously  PSYCH: Appropriate mood & affect. No VH/AH. No SI/HI.    LABS:                        15.1   5.69  )-----------( 240      ( 30 Nov 2021 08:10 )             45.3     11-30    138  |  102  |  15  ----------------------------<  127<H>  4.2   |  27  |  0.82    Ca    8.9      30 Nov 2021 07:02  Phos  3.5     11-30  Mg     2.20     11-30      PT/INR - ( 30 Nov 2021 08:10 )   PT: 12.2 sec;   INR: 1.07 ratio         PTT - ( 30 Nov 2021 08:10 )  PTT:32.4 sec          MICROBIOLOGY      IMAGING        COORDINATION OF CARE:  ---PCP:  ---Consultants:   Charlie Yun M.D.  [facility] PGY-1    PROGRESS NOTE:     Patient is a 51y old  Male who presents with a chief complaint of Jaw Swelling (30 Nov 2021 10:28)      -OVERNIGHT EVENTS-  NAEON (s/p mesh extraction)    -SUBJECTIVE-   Khmer #[id number]  Mr. Fulton says that he has some pain this morning but it's well controlled on his current regimen. He's curious about whether he will still need his teeth extracted and when that will be done. He denies any fever, chills, chest pain, shortness of breath, or worsening discharge from the surgical site. He did say that he tasted something bad after the initial I&D, but he's otherwise been okay.    MEDICATIONS  (STANDING):  ampicillin/sulbactam  IVPB 3 Gram(s) IV Intermittent every 6 hours  atorvastatin 10 milliGRAM(s) Oral at bedtime  chlorhexidine 0.12% Liquid 15 milliLiter(s) Swish and Spit two times a day  levothyroxine 75 MICROGram(s) Oral daily  vancomycin  IVPB      vancomycin  IVPB 1250 milliGRAM(s) IV Intermittent once  vancomycin  IVPB 1250 milliGRAM(s) IV Intermittent every 12 hours    MEDICATIONS  (PRN):  acetaminophen     Tablet .. 650 milliGRAM(s) Oral every 6 hours PRN Mild Pain (1 - 3)  ketorolac   Injectable 15 milliGRAM(s) IV Push every 6 hours PRN Moderate Pain (4 - 6)  oxyCODONE    IR 5 milliGRAM(s) Oral every 6 hours PRN Severe Pain (7 - 10)      -OBJECTIVE-    CAPILLARY BLOOD GLUCOSE        I&O's Summary    30 Nov 2021 07:01  -  01 Dec 2021 07:00  --------------------------------------------------------  IN: 670 mL / OUT: 0 mL / NET: 670 mL        VITAL SIGNS  Vital Signs Last 24 Hrs  T(C): 36.9 (01 Dec 2021 06:58), Max: 36.9 (30 Nov 2021 13:00)  T(F): 98.4 (01 Dec 2021 06:58), Max: 98.5 (30 Nov 2021 13:00)  HR: 73 (01 Dec 2021 06:58) (68 - 82)  BP: 114/74 (01 Dec 2021 06:58) (114/68 - 136/79)  BP(mean): --  RR: 16 (01 Dec 2021 06:58) (16 - 18)  SpO2: 100% (01 Dec 2021 06:58) (98% - 100%)    PHYSICAL EXAM:  GENERAL/CONSTITUTIONAL: NAD, Awake, AOx3 (person, place, time)  HEENT: NCAT  ---EYES: no scleral icterus, EOMI, PERRLA  ---ENMT: MMM,   ---NECK: No palpable masses; no thyromegaly  CARDIO: RRR. Normal S1/S2, no m/r/g.  RESP: Normal respiratory effort; CTAB, no w/r/r  ABD: Soft, NTND; +BS.   : No CVAT.   MSK: No obvious deformity or ROM deficit.  ---EXTREMITIES: No peripheral edema. Peripheral pulses 2+ x4.  SKIN: Warm, dry. No rashes.   NEURO: Moves all four extremities spontaneously  PSYCH: Appropriate mood & affect. No VH/AH. No SI/HI.    LABS:                        15.1   5.69  )-----------( 240      ( 30 Nov 2021 08:10 )             45.3     11-30    138  |  102  |  15  ----------------------------<  127<H>  4.2   |  27  |  0.82    Ca    8.9      30 Nov 2021 07:02  Phos  3.5     11-30  Mg     2.20     11-30      PT/INR - ( 30 Nov 2021 08:10 )   PT: 12.2 sec;   INR: 1.07 ratio         PTT - ( 30 Nov 2021 08:10 )  PTT:32.4 sec          MICROBIOLOGY      IMAGING        COORDINATION OF CARE:  ---PCP:  ---Consultants:   Charlie Yun M.D.  [facility] PGY-1    PROGRESS NOTE:     Patient is a 51y old  Male who presents with a chief complaint of Jaw Swelling (30 Nov 2021 10:28)      -OVERNIGHT EVENTS-  NAEON (s/p mesh extraction)    -SUBJECTIVE-   Mongolian #[id number]  Mr. Fulton says that he has some pain this morning but it's well controlled on his current regimen. He's curious about whether he will still need his teeth extracted and when that will be done. He denies any fever, chills, chest pain, shortness of breath, or worsening discharge from the surgical site. He did say that he tasted something bad after the initial I&D, but he's otherwise been okay.    MEDICATIONS  (STANDING):  ampicillin/sulbactam  IVPB 3 Gram(s) IV Intermittent every 6 hours  atorvastatin 10 milliGRAM(s) Oral at bedtime  chlorhexidine 0.12% Liquid 15 milliLiter(s) Swish and Spit two times a day  levothyroxine 75 MICROGram(s) Oral daily  vancomycin  IVPB      vancomycin  IVPB 1250 milliGRAM(s) IV Intermittent once  vancomycin  IVPB 1250 milliGRAM(s) IV Intermittent every 12 hours    MEDICATIONS  (PRN):  acetaminophen     Tablet .. 650 milliGRAM(s) Oral every 6 hours PRN Mild Pain (1 - 3)  ketorolac   Injectable 15 milliGRAM(s) IV Push every 6 hours PRN Moderate Pain (4 - 6)  oxyCODONE    IR 5 milliGRAM(s) Oral every 6 hours PRN Severe Pain (7 - 10)      -OBJECTIVE-    CAPILLARY BLOOD GLUCOSE        I&O's Summary    30 Nov 2021 07:01  -  01 Dec 2021 07:00  --------------------------------------------------------  IN: 670 mL / OUT: 0 mL / NET: 670 mL        VITAL SIGNS  Vital Signs Last 24 Hrs  T(C): 36.9 (01 Dec 2021 06:58), Max: 36.9 (30 Nov 2021 13:00)  T(F): 98.4 (01 Dec 2021 06:58), Max: 98.5 (30 Nov 2021 13:00)  HR: 73 (01 Dec 2021 06:58) (68 - 82)  BP: 114/74 (01 Dec 2021 06:58) (114/68 - 136/79)  BP(mean): --  RR: 16 (01 Dec 2021 06:58) (16 - 18)  SpO2: 100% (01 Dec 2021 06:58) (98% - 100%)    PHYSICAL EXAM:  GENERAL/CONSTITUTIONAL: NAD, Awake, AOx3 (person, place, time)  HEENT: NCAT  ---EYES: no scleral icterus, EOMI, PERRLA  ---ENMT: MMM, surgical site mildly obscured by remnant food particles but no obvious discharge, odor, or erythema; L mandibular skin w/o erythema, mild TTP, swelling significantly improved  CARDIO: RRR.   RESP: Normal respiratory effort; CTAB, no w/r/r  ABD: Soft, NTND; +BS.   MSK: No obvious deformity or ROM deficit.  ---EXTREMITIES: No peripheral edema. Peripheral pulses 2+ x4.  SKIN: Warm, dry. No rashes.   NEURO: Moves all four extremities spontaneously  PSYCH: Appropriate mood & affect.     LABS:                        15.1   5.69  )-----------( 240      ( 30 Nov 2021 08:10 )             45.3     11-30    138  |  102  |  15  ----------------------------<  127<H>  4.2   |  27  |  0.82    Ca    8.9      30 Nov 2021 07:02  Phos  3.5     11-30  Mg     2.20     11-30      PT/INR - ( 30 Nov 2021 08:10 )   PT: 12.2 sec;   INR: 1.07 ratio         PTT - ( 30 Nov 2021 08:10 )  PTT:32.4 sec    MICROBIOLOGY  Culture Results:   Testing in progress (11.30.21 @ 14:48)   Culture Results:   Rare Gram positive organisms (11.30.21 @ 14:47)   Culture Results:   Testing in progress (11.27.21 @ 18:18)   Culture Results:   Culture is being performed. (11.27.21 @ 18:17)   Culture Results:   Normal mouth polo isolated (11.27.21 @ 16:11)     IMAGING  No interval imaging      COORDINATION OF CARE:  ---PCP:  ---Consultants: JOSSELYN VELÁSQUEZ (signed off)

## 2021-12-01 NOTE — PROGRESS NOTE ADULT - ASSESSMENT
51M w/ L facial swelling a/w tooth #18 and mandibular implant. Now, s/p removal of infected chin implant/ mesh (11/30)     - cont abx per ID   - Rec analgesia prn  - HOB elevated  - Peridex BID  - No further OMFS intervention at this time, will sign off. Pt to f/u 1 wk OMFS clinic with Dr. Rousseau. Please call  to confirm appointment Wednesday (12/8) 3:30pm.   - d/c pt augmentin, peridex, soft diet   - Pt to f/u w/ general dentist for extraction of teeth

## 2021-12-01 NOTE — PROGRESS NOTE ADULT - ASSESSMENT
52yo M with hx of hypothyroidism, HLD and prior mandibular implant presenting as transfer from [facility] for evaluation of facial swelling, imaging findings suspicious for mandibular abscess with extension into mandibular implant.    //L. Mandibular abscess with extension into mandibular implant  -S/p OR 11/30 with removal of mesh, unclear if entire prosthetic material was removed  -I&D cultures 11/27 showing normal mouth polo    Suggest  -F/U OR cultures- preliminary gpc  -continue unasyn  -d/c dominique Crow MD  Pager: [phone]  After 5 PM or weekends please call fellow on call or office [phone]

## 2021-12-02 ENCOUNTER — TRANSCRIPTION ENCOUNTER (OUTPATIENT)
Age: 51
End: 2021-12-02

## 2021-12-02 LAB
-  AMPICILLIN/SULBACTAM: SIGNIFICANT CHANGE UP
-  CEFAZOLIN: SIGNIFICANT CHANGE UP
-  CLINDAMYCIN: SIGNIFICANT CHANGE UP
-  ERYTHROMYCIN: SIGNIFICANT CHANGE UP
-  GENTAMICIN: SIGNIFICANT CHANGE UP
-  OXACILLIN: SIGNIFICANT CHANGE UP
-  PENICILLIN: SIGNIFICANT CHANGE UP
-  RIFAMPIN: SIGNIFICANT CHANGE UP
-  TETRACYCLINE: SIGNIFICANT CHANGE UP
-  TRIMETHOPRIM/SULFAMETHOXAZOLE: SIGNIFICANT CHANGE UP
-  VANCOMYCIN: SIGNIFICANT CHANGE UP
ANION GAP SERPL CALC-SCNC: 9 MMOL/L — SIGNIFICANT CHANGE UP (ref 7–14)
BASOPHILS # BLD AUTO: 0.03 K/UL — SIGNIFICANT CHANGE UP (ref 0–0.2)
BASOPHILS NFR BLD AUTO: 0.5 % — SIGNIFICANT CHANGE UP (ref 0–2)
BUN SERPL-MCNC: 14 MG/DL — SIGNIFICANT CHANGE UP (ref 7–23)
CALCIUM SERPL-MCNC: 9.1 MG/DL — SIGNIFICANT CHANGE UP (ref 8.4–10.5)
CHLORIDE SERPL-SCNC: 105 MMOL/L — SIGNIFICANT CHANGE UP (ref 98–107)
CO2 SERPL-SCNC: 26 MMOL/L — SIGNIFICANT CHANGE UP (ref 22–31)
CREAT SERPL-MCNC: 0.87 MG/DL — SIGNIFICANT CHANGE UP (ref 0.5–1.3)
CULTURE RESULTS: SIGNIFICANT CHANGE UP
EOSINOPHIL # BLD AUTO: 0.21 K/UL — SIGNIFICANT CHANGE UP (ref 0–0.5)
EOSINOPHIL NFR BLD AUTO: 3.4 % — SIGNIFICANT CHANGE UP (ref 0–6)
GLUCOSE SERPL-MCNC: 91 MG/DL — SIGNIFICANT CHANGE UP (ref 70–99)
HCT VFR BLD CALC: 42.3 % — SIGNIFICANT CHANGE UP (ref 39–50)
HGB BLD-MCNC: 13.8 G/DL — SIGNIFICANT CHANGE UP (ref 13–17)
IANC: 3.72 K/UL — SIGNIFICANT CHANGE UP (ref 1.5–8.5)
IMM GRANULOCYTES NFR BLD AUTO: 0.2 % — SIGNIFICANT CHANGE UP (ref 0–1.5)
LYMPHOCYTES # BLD AUTO: 1.71 K/UL — SIGNIFICANT CHANGE UP (ref 1–3.3)
LYMPHOCYTES # BLD AUTO: 27.6 % — SIGNIFICANT CHANGE UP (ref 13–44)
MAGNESIUM SERPL-MCNC: 2.3 MG/DL — SIGNIFICANT CHANGE UP (ref 1.6–2.6)
MCHC RBC-ENTMCNC: 29.9 PG — SIGNIFICANT CHANGE UP (ref 27–34)
MCHC RBC-ENTMCNC: 32.6 GM/DL — SIGNIFICANT CHANGE UP (ref 32–36)
MCV RBC AUTO: 91.8 FL — SIGNIFICANT CHANGE UP (ref 80–100)
METHOD TYPE: SIGNIFICANT CHANGE UP
MONOCYTES # BLD AUTO: 0.52 K/UL — SIGNIFICANT CHANGE UP (ref 0–0.9)
MONOCYTES NFR BLD AUTO: 8.4 % — SIGNIFICANT CHANGE UP (ref 2–14)
NEUTROPHILS # BLD AUTO: 3.72 K/UL — SIGNIFICANT CHANGE UP (ref 1.8–7.4)
NEUTROPHILS NFR BLD AUTO: 59.9 % — SIGNIFICANT CHANGE UP (ref 43–77)
NRBC # BLD: 0 /100 WBCS — SIGNIFICANT CHANGE UP
NRBC # FLD: 0 K/UL — SIGNIFICANT CHANGE UP
ORGANISM # SPEC MICROSCOPIC CNT: SIGNIFICANT CHANGE UP
ORGANISM # SPEC MICROSCOPIC CNT: SIGNIFICANT CHANGE UP
PHOSPHATE SERPL-MCNC: 3.7 MG/DL — SIGNIFICANT CHANGE UP (ref 2.5–4.5)
PLATELET # BLD AUTO: 251 K/UL — SIGNIFICANT CHANGE UP (ref 150–400)
POTASSIUM SERPL-MCNC: 4.2 MMOL/L — SIGNIFICANT CHANGE UP (ref 3.5–5.3)
POTASSIUM SERPL-SCNC: 4.2 MMOL/L — SIGNIFICANT CHANGE UP (ref 3.5–5.3)
RBC # BLD: 4.61 M/UL — SIGNIFICANT CHANGE UP (ref 4.2–5.8)
RBC # FLD: 11.9 % — SIGNIFICANT CHANGE UP (ref 10.3–14.5)
SODIUM SERPL-SCNC: 140 MMOL/L — SIGNIFICANT CHANGE UP (ref 135–145)
SPECIMEN SOURCE: SIGNIFICANT CHANGE UP
WBC # BLD: 6.2 K/UL — SIGNIFICANT CHANGE UP (ref 3.8–10.5)
WBC # FLD AUTO: 6.2 K/UL — SIGNIFICANT CHANGE UP (ref 3.8–10.5)

## 2021-12-02 PROCEDURE — 99233 SBSQ HOSP IP/OBS HIGH 50: CPT | Mod: GC

## 2021-12-02 PROCEDURE — 99232 SBSQ HOSP IP/OBS MODERATE 35: CPT

## 2021-12-02 RX ORDER — LACTOBACILLUS ACIDOPHILUS 100MM CELL
1 CAPSULE ORAL DAILY
Refills: 0 | Status: DISCONTINUED | OUTPATIENT
Start: 2021-12-02 | End: 2021-12-03

## 2021-12-02 RX ORDER — ENOXAPARIN SODIUM 100 MG/ML
40 INJECTION SUBCUTANEOUS DAILY
Refills: 0 | Status: DISCONTINUED | OUTPATIENT
Start: 2021-12-02 | End: 2021-12-03

## 2021-12-02 RX ADMIN — AMPICILLIN SODIUM AND SULBACTAM SODIUM 200 GRAM(S): 250; 125 INJECTION, POWDER, FOR SUSPENSION INTRAMUSCULAR; INTRAVENOUS at 09:59

## 2021-12-02 RX ADMIN — CHLORHEXIDINE GLUCONATE 15 MILLILITER(S): 213 SOLUTION TOPICAL at 07:19

## 2021-12-02 RX ADMIN — AMPICILLIN SODIUM AND SULBACTAM SODIUM 200 GRAM(S): 250; 125 INJECTION, POWDER, FOR SUSPENSION INTRAMUSCULAR; INTRAVENOUS at 03:43

## 2021-12-02 RX ADMIN — AMPICILLIN SODIUM AND SULBACTAM SODIUM 200 GRAM(S): 250; 125 INJECTION, POWDER, FOR SUSPENSION INTRAMUSCULAR; INTRAVENOUS at 17:01

## 2021-12-02 RX ADMIN — AMPICILLIN SODIUM AND SULBACTAM SODIUM 200 GRAM(S): 250; 125 INJECTION, POWDER, FOR SUSPENSION INTRAMUSCULAR; INTRAVENOUS at 22:02

## 2021-12-02 RX ADMIN — ATORVASTATIN CALCIUM 10 MILLIGRAM(S): 80 TABLET, FILM COATED ORAL at 22:02

## 2021-12-02 RX ADMIN — Medication 75 MICROGRAM(S): at 07:18

## 2021-12-02 RX ADMIN — CHLORHEXIDINE GLUCONATE 15 MILLILITER(S): 213 SOLUTION TOPICAL at 17:00

## 2021-12-02 NOTE — DISCHARGE NOTE PROVIDER - NSDCCPCAREPLAN_GEN_ALL_CORE_FT
PRINCIPAL DISCHARGE DIAGNOSIS  Diagnosis: Mandibular swelling  Assessment and Plan of Treatment:        PRINCIPAL DISCHARGE DIAGNOSIS  Diagnosis: Mandibular swelling  Assessment and Plan of Treatment: You had an infection in your chin implant. Our OMFS service removed the implant, and you were treated with broad-spectrum antibiotics. We did not identify a specific bacteria that would cause significant problems, and we did not find evidence that you have an infection in the bone.  - CONTINUE Augmentin 875mg by mouth every 12 hours through 12/10/2021      SECONDARY DISCHARGE DIAGNOSES  Diagnosis: Hypothyroid  Assessment and Plan of Treatment: You have hypothyroidism. We checked your thyroid levels here, which showed a mildly elevated TSH (5-7) and normal T4 (1.0). We did not change your home dose of levothyroxine.  - CONTINUE Levothyroxine 75ug by mouth every day    Diagnosis: Hypopigmented skin lesion  Assessment and Plan of Treatment: You have what we think is Vitiligo. You should follow-up with a dermatologist as an outpatient to further evaluate and manage this problem.    Diagnosis: Hyperlipemia  Assessment and Plan of Treatment: You have high cholesterol and were continued on your home regimen of Atorvastatin. Continue to take this medication as prescribed.     PRINCIPAL DISCHARGE DIAGNOSIS  Diagnosis: Mandibular swelling  Assessment and Plan of Treatment: You had an infection in your chin implant. Our OMFS service removed the implant, and you were treated with broad-spectrum antibiotics. We did not identify a specific bacteria that would cause significant problems, and we did not find evidence that you have an infection in the bone.  - CONTINUE Augmentin 875mg by mouth every 12 hours through 12/10/2021  -Siga tomando el antibiotico Augmentin dos veces al evin hasta el 10 de Diciembre.  -Tiene lawson alvaro con el charisma Rousseau el 8 de Dicembre a las 3:30. Hemos dejado marc datos.      SECONDARY DISCHARGE DIAGNOSES  Diagnosis: Hypothyroid  Assessment and Plan of Treatment: You have hypothyroidism. We checked your thyroid levels here, which showed a mildly elevated TSH (5-7) and normal T4 (1.0). We did not change your home dose of levothyroxine.  - CONTINUE Levothyroxine 75ug by mouth every day  -Siga tomando Levothyroxine cada evin    Diagnosis: Hypopigmented skin lesion  Assessment and Plan of Treatment: You have what we think is Vitiligo. You should follow-up with a dermatologist as an outpatient to further evaluate and manage this problem.  Usted tiene un poco de irregularidad con la pigmentacion de la piel. Nba vez es lawson condicion que se llama Vitiligo. Roxana seguimiento con el dermatologo para evaluarla. Hemos dejado lawson referencia.    Diagnosis: Hyperlipemia  Assessment and Plan of Treatment: You have high cholesterol and were continued on your home regimen of Atorvastatin. Continue to take this medication as prescribed.  Siga tomando el atorvastatin cada evin para el colesterol

## 2021-12-02 NOTE — DISCHARGE NOTE PROVIDER - NSDCCAREPROVSEEN_GEN_ALL_CORE_FT
[facility] Medicine Team 3  Oral & Maxillofacial Surgery  Plastic Surgery  Dental  Infectious Disease

## 2021-12-02 NOTE — PROGRESS NOTE ADULT - PROBLEM SELECTOR PLAN 1
Thorough documentation of subjective included above. DDx includes infectious vs non-infectious swelling, including or excluding the implant he's reported to have had placed. Cultures of reported aspirate have been sent (though documentation of who collect or where the sample came from is as yet unclear). Now with GPRs. Unclear if still normal polo or a more indolent pathogen, will await speciation for ongoing management.  Diagnostics  - Plastics C/S, Appreciate Recs (signed off)  - OMFS/Dental C/S s/p I&D & operation; plan for tooth care as OP with dental  [x] MR Facial Bones c Contrast: c/f ondotogenic origin, communicated with plastics attg who defers to OMFS; OMFS will evaluate    Therapeutics  - Pain Control: APAP 650mg PO Q6H PRN, Toradol 15mg IV PRN  - C/W Ampicillin/Sulbactam ("Unasyn") 3g IV Q6H (11/27-)  - C/W Vancomycin 1000mg IV Q12H (11/27-) (Vanc Trough After 4th Dose if still necessary)

## 2021-12-02 NOTE — DISCHARGE NOTE PROVIDER - NSDCFUADDINST_GEN_ALL_CORE_FT
You were seen in the Hospital for an infection in your jaw implant. This was removed by our oral surgeons, and you were given IV Antibiotics for this problem.     You should:  - Continue to take your antibiotics as prescribed until they are completely finished.  - make an appointment with a dentist (any dentist) to follow-up about management of your back teeth  - Make an outpatient appointment with a plastic surgeon for future facial surgeries

## 2021-12-02 NOTE — PROGRESS NOTE ADULT - PROBLEM SELECTOR PLAN 5
Hospital Bundle  Fluids: PO ad margareth  Electrolytes: Replete K > 4, Mg > 2, Phos > 3  Nutrition: Diet DASH  PPX  ---VTE: SCDs, ambulate  ---GI: N/A  ---Resp: N/A  Access: PIV  Code Status: FULL CODE  Dispo: Pending surgical management, likely to home when cleared from ID perspective

## 2021-12-02 NOTE — PROGRESS NOTE ADULT - PROBLEM SELECTOR PLAN 2
H/O Hypothyroidism. TSH slightly elevated, T4 WNL. Likely 2/2 active stressors, but will communicate results in discharge summary.  - C/W Home Levothyroxine 75ug PO QD

## 2021-12-02 NOTE — DISCHARGE NOTE PROVIDER - HOSPITAL COURSE
HOSPITAL COURSE  Mr. Fulton was admitted as a transfer from [facility] where he was found to have a left-sided HOSPITAL COURSE  Mr. Fulton is a 51M with HTN, Hypothyroidism, and h/o mandibular / chin implaint (15-16 years ago) who was admitted as a transfer from Health system where he was found to have a left-sided facial swelling, erythema, and pain and imaging findings concerning for an underlying infectious process involving his facial/mandibular implant. He remained hemodynamically stable, afebrile, and without leukocytosis throughout the initial evaluation and management of this problem. He was evaluated by Radiology (CT & MR Max-Face), Plastic Surgery, Dental, and OMFS and determined to have a likely infection involving his previously implanted mesh anterior to the left mandible with likely odontogenic source. Dental performed an I&D and ultimately OMFS removed the implanted mesh. He was managed on Unasyn and Vancomycin IV initially (Vanc discontinued ico negative cultures, PCR for MRSA). Ultimately, because he lacked leukocytosis, fever, or persistent pain after removal of the implant, it was felt that he most likely had an isolated infection of the tooth and with source control obtained by removal of his implant.     He was hemodynamically stable and afebrile throughout admission. On discharge, he was started on Augmentin 875mg PO BID to complete on 12/10/2021. HOSPITAL COURSE  Mr. Fulton is a 51M with HTN, Hypothyroidism, and h/o mandibular / chin implaint (15-16 years ago) who was admitted as a transfer from [facility] where he was found to have a left-sided facial swelling, erythema, and pain and imaging findings concerning for an underlying infectious process involving his facial/mandibular implant. He remained hemodynamically stable, afebrile, and without leukocytosis throughout the initial evaluation and management of this problem. He was evaluated by Radiology (CT & MR Max-Face), Plastic Surgery, Dental, and OMFS and determined to have a likely infection involving his previously implanted mesh anterior to the left mandible with likely odontogenic source. Dental performed an I&D and ultimately OMFS removed the implanted mesh. He was managed on Unasyn and Vancomycin IV initially (Vanc discontinued iso negative cultures, PCR for MRSA). Ultimately, because he lacked leukocytosis, fever, or persistent pain after removal of the implant, it was felt that he most likely had an isolated infection of the tooth and with source control obtained by removal of his implant.     He was hemodynamically stable and afebrile throughout admission. On discharge, he was started on Augmentin 875mg PO BID to complete on 12/10/2021 per ID recs. HOSPITAL COURSE  Mr. Fulton is a 51M with HTN, Hypothyroidism, and h/o mandibular / chin implaint (15-16 years ago) who was admitted as a transfer from [facility] where he was found to have a left-sided facial swelling, erythema, and pain and imaging findings concerning for an underlying infectious process involving his facial/mandibular implant. He remained hemodynamically stable, afebrile, and without leukocytosis throughout the initial evaluation and management of this problem. He was evaluated by Radiology (CT & MR Max-Face), Plastic Surgery, Dental, and OMFS and determined to have a likely infection involving his previously implanted mesh anterior to the left mandible with likely odontogenic source. Dental performed an I&D and ultimately OMFS removed the implanted mesh. He was managed on Unasyn and Vancomycin IV initially (Vanc discontinued iso negative cultures, PCR for MRSA). Ultimately, because he lacked leukocytosis, fever, or persistent pain after removal of the implant, it was felt that he most likely had an isolated infection of the tooth and with source control obtained by removal of his implant.     He was hemodynamically stable and afebrile throughout admission. On discharge, he was started on Augmentin 875mg PO BID to complete on 12/10/2021 per ID recs.    Discharge planning time 35 minutes.

## 2021-12-02 NOTE — PROGRESS NOTE ADULT - SUBJECTIVE AND OBJECTIVE BOX
Follow Up:  mandible infection, s/p removal of infected chin prosthesis    Interval History/ROS:  feels much better.  minimal pain left mandible.  spoke to micro tech- requested w/u multiple organisms in mandible abscess    Allergies  No Known Allergies        ANTIMICROBIALS:  ampicillin/sulbactam  IVPB 3 every 6 hours        OTHER MEDS:  acetaminophen     Tablet .. 650 milliGRAM(s) Oral every 6 hours PRN  atorvastatin 10 milliGRAM(s) Oral at bedtime  chlorhexidine 0.12% Liquid 15 milliLiter(s) Swish and Spit two times a day  ketorolac   Injectable 15 milliGRAM(s) IV Push every 6 hours PRN  levothyroxine 75 MICROGram(s) Oral daily  oxyCODONE    IR 5 milliGRAM(s) Oral every 6 hours PRN    Vital Signs Last 24 Hrs  T(F): 98.6 (12-02-21 @ 14:21), Max: 99 (12-01-21 @ 20:49)  HR: 63 (12-02-21 @ 14:21)  BP: 122/74 (12-02-21 @ 14:21)  RR: 17 (12-02-21 @ 14:21)  SpO2: 100% (12-02-21 @ 14:21) (97% - 100%)    PHYSICAL EXAM:  Constitutional: Not in acute distress  Eyes: No icterus.  Oral cavity: swelling left mandible  Neck: Supple  RS: Chest clear to auscultation bilaterally. No wheeze/rhonchi/crepitations.  CVS: S1, S2 heard. Regular rate and rhythm. No murmurs/rubs/gallops.  Abdomen: Soft. No guarding/rigidity/tenderness.  Extremities: Warm. No pedal edema  Skin: No lesions noted  Vascular: No evidence of phlebitis  Neuro: Alert, oriented to time/place/person  Cranial nerves 2-12 grossly normal. No focal abnormalities                                     13.8   6.20  )-----------( 251      ( 02 Dec 2021 07:24 )             42.3 12-02    140  |  105  |  14  ----------------------------<  91  4.2   |  26  |  0.87  Ca    9.1      02 Dec 2021 07:24Phos  3.7     12-02Mg     2.30     12-02              MICROBIOLOGY:  Vancomycin Level, Trough: 7.9 ug/mL (12-01-21 @ 05:19)  v  .Smear left mandibular  11-30-21   Testing in progress  --    No polymorphonuclear leukocytes per low power field  Moderate Gram positive cocci in pairs per oil power field  Few Gram Positive Rods per oil power field      .Abscess left mandibular abscess  11-30-21   Rare Gram positive organisms  --  --      .Other mouth  11-27-21   Testing in progress  --  --      .Other Other, Oral  11-27-21   Culture is being performed.  --  --      .Abscess Mouth  11-27-21   Normal mouth polo isolated  --  --          Rapid RVP Result: [place] (11-27 @ 02:25)        RADIOLOGY:

## 2021-12-02 NOTE — PROGRESS NOTE ADULT - SUBJECTIVE AND OBJECTIVE BOX
Charlie Yun M.D.  [facility] PGY-1    PROGRESS NOTE:     Patient is a 51y old  Male who presents with a chief complaint of Jaw Swelling (01 Dec 2021 17:53)      -OVERNIGHT EVENTS-      -SUBJECTIVE-      MEDICATIONS  (STANDING):  ampicillin/sulbactam  IVPB 3 Gram(s) IV Intermittent every 6 hours  atorvastatin 10 milliGRAM(s) Oral at bedtime  chlorhexidine 0.12% Liquid 15 milliLiter(s) Swish and Spit two times a day  levothyroxine 75 MICROGram(s) Oral daily    MEDICATIONS  (PRN):  acetaminophen     Tablet .. 650 milliGRAM(s) Oral every 6 hours PRN Mild Pain (1 - 3)  ketorolac   Injectable 15 milliGRAM(s) IV Push every 6 hours PRN Moderate Pain (4 - 6)  oxyCODONE    IR 5 milliGRAM(s) Oral every 6 hours PRN Severe Pain (7 - 10)      -OBJECTIVE-    CAPILLARY BLOOD GLUCOSE        I&O's Summary    01 Dec 2021 07:01  -  02 Dec 2021 07:00  --------------------------------------------------------  IN: 750 mL / OUT: 0 mL / NET: 750 mL        VITAL SIGNS  Vital Signs Last 24 Hrs  T(C): 37.1 (02 Dec 2021 06:56), Max: 37.3 (01 Dec 2021 14:05)  T(F): 98.7 (02 Dec 2021 06:56), Max: 99.2 (01 Dec 2021 14:05)  HR: 60 (02 Dec 2021 06:56) (60 - 64)  BP: 112/70 (02 Dec 2021 06:56) (111/71 - 123/70)  BP(mean): --  RR: 17 (02 Dec 2021 06:56) (17 - 17)  SpO2: 98% (02 Dec 2021 06:56) (97% - 98%)    PHYSICAL EXAM:  GENERAL/CONSTITUTIONAL: NAD, Awake, AOx3 (person, place, time)  HEENT: NCAT  ---EYES: no scleral icterus, EOMI, PERRLA  ---ENMT: MMM,   ---NECK: No palpable masses; no thyromegaly  CARDIO: RRR. Normal S1/S2, no m/r/g.  RESP: Normal respiratory effort; CTAB, no w/r/r  ABD: Soft, NTND; +BS.   : No CVAT.   MSK: No obvious deformity or ROM deficit.  ---EXTREMITIES: No peripheral edema. Peripheral pulses 2+ x4.  SKIN: Warm, dry. No rashes.   NEURO: Moves all four extremities spontaneously  PSYCH: Appropriate mood & affect. No VH/AH. No SI/HI.    LABS:                        13.8   6.20  )-----------( 251      ( 02 Dec 2021 07:24 )             42.3     12-01    135  |  100  |  12  ----------------------------<  103<H>  4.0   |  26  |  0.80    Ca    9.0      01 Dec 2021 08:02  Phos  2.9     12-01  Mg     2.20     12-01      PT/INR - ( 30 Nov 2021 08:10 )   PT: 12.2 sec;   INR: 1.07 ratio         PTT - ( 30 Nov 2021 08:10 )  PTT:32.4 sec          MICROBIOLOGY    Culture - Fungal, Other (collected 30 Nov 2021 14:48)  Source: .Other left mandibular abscess  Preliminary Report (01 Dec 2021 07:33):    Testing in progress    Culture - Abscess with Gram Stain (collected 30 Nov 2021 14:47)  Source: .Abscess left mandibular abscess  Preliminary Report (01 Dec 2021 09:17):    Rare Gram positive organisms        IMAGING        COORDINATION OF CARE:  ---PCP:  ---Consultants:   Charlie Yun M.D.  [facility] PGY-1    PROGRESS NOTE:     Patient is a 51y old  Male who presents with a chief complaint of Jaw Swelling (01 Dec 2021 17:53)      -OVERNIGHT EVENTS-  NAEON    -SUBJECTIVE-   #[id number]  Mr. Fulton continues to have mild pain a/w his oral surgery but it's well controlled on his current regimen.    MEDICATIONS  (STANDING):  ampicillin/sulbactam  IVPB 3 Gram(s) IV Intermittent every 6 hours  atorvastatin 10 milliGRAM(s) Oral at bedtime  chlorhexidine 0.12% Liquid 15 milliLiter(s) Swish and Spit two times a day  levothyroxine 75 MICROGram(s) Oral daily    MEDICATIONS  (PRN):  acetaminophen     Tablet .. 650 milliGRAM(s) Oral every 6 hours PRN Mild Pain (1 - 3)  ketorolac   Injectable 15 milliGRAM(s) IV Push every 6 hours PRN Moderate Pain (4 - 6)  oxyCODONE    IR 5 milliGRAM(s) Oral every 6 hours PRN Severe Pain (7 - 10)      -OBJECTIVE-    CAPILLARY BLOOD GLUCOSE        I&O's Summary    01 Dec 2021 07:01  -  02 Dec 2021 07:00  --------------------------------------------------------  IN: 750 mL / OUT: 0 mL / NET: 750 mL        VITAL SIGNS  Vital Signs Last 24 Hrs  T(C): 37.1 (02 Dec 2021 06:56), Max: 37.3 (01 Dec 2021 14:05)  T(F): 98.7 (02 Dec 2021 06:56), Max: 99.2 (01 Dec 2021 14:05)  HR: 60 (02 Dec 2021 06:56) (60 - 64)  BP: 112/70 (02 Dec 2021 06:56) (111/71 - 123/70)  BP(mean): --  RR: 17 (02 Dec 2021 06:56) (17 - 17)  SpO2: 98% (02 Dec 2021 06:56) (97% - 98%)    PHYSICAL EXAM:  GENERAL/CONSTITUTIONAL: NAD, Awake, AOx3 (person, place, time)  HEENT: NCAT  ---EYES: no scleral icterus, EOMI  ---ENMT: MMM, aphthous ulcerations on left, lateral tongue; mucinous discharge near surgical site w/o obvious purulence or odor  CARDIO: RRR.   RESP: Normal respiratory effort;  ABD: Soft, NTND;  MSK: No obvious deformity or ROM deficit.  ---EXTREMITIES: No peripheral edema. Peripheral pulses 2+ x4.  SKIN: Warm, dry. No rashes.   NEURO: Moves all four extremities spontaneously  PSYCH: Appropriate mood & affect.   LABS:                        13.8   6.20  )-----------( 251      ( 02 Dec 2021 07:24 )             42.3     12-01    135  |  100  |  12  ----------------------------<  103<H>  4.0   |  26  |  0.80    Ca    9.0      01 Dec 2021 08:02  Phos  2.9     12-01  Mg     2.20     12-01      PT/INR - ( 30 Nov 2021 08:10 )   PT: 12.2 sec;   INR: 1.07 ratio         PTT - ( 30 Nov 2021 08:10 )  PTT:32.4 sec          MICROBIOLOGY    Culture - Fungal, Other (collected 30 Nov 2021 14:48)  Source: .Other left mandibular abscess  Preliminary Report (01 Dec 2021 07:33):    Testing in progress    Culture - Abscess with Gram Stain (collected 30 Nov 2021 14:47)  Source: .Abscess left mandibular abscess  Preliminary Report (01 Dec 2021 09:17):    Rare Gram positive organisms        IMAGING        COORDINATION OF CARE:  ---PCP:  ---Consultants:   Charlie Yun M.D.  [facility] PGY-1    PROGRESS NOTE:     Patient is a 51y old  Male who presents with a chief complaint of Jaw Swelling (01 Dec 2021 17:53)      -OVERNIGHT EVENTS-  NAEON    -SUBJECTIVE-   #[id number]  Mr. Fulton continues to have mild pain a/w his oral surgery but it's well controlled on his current regimen. He's curious about when he can leave but understands the need for the antibiotics.    MEDICATIONS  (STANDING):  ampicillin/sulbactam  IVPB 3 Gram(s) IV Intermittent every 6 hours  atorvastatin 10 milliGRAM(s) Oral at bedtime  chlorhexidine 0.12% Liquid 15 milliLiter(s) Swish and Spit two times a day  levothyroxine 75 MICROGram(s) Oral daily    MEDICATIONS  (PRN):  acetaminophen     Tablet .. 650 milliGRAM(s) Oral every 6 hours PRN Mild Pain (1 - 3)  ketorolac   Injectable 15 milliGRAM(s) IV Push every 6 hours PRN Moderate Pain (4 - 6)  oxyCODONE    IR 5 milliGRAM(s) Oral every 6 hours PRN Severe Pain (7 - 10)      -OBJECTIVE-    CAPILLARY BLOOD GLUCOSE        I&O's Summary    01 Dec 2021 07:01  -  02 Dec 2021 07:00  --------------------------------------------------------  IN: 750 mL / OUT: 0 mL / NET: 750 mL        VITAL SIGNS  Vital Signs Last 24 Hrs  T(C): 37.1 (02 Dec 2021 06:56), Max: 37.3 (01 Dec 2021 14:05)  T(F): 98.7 (02 Dec 2021 06:56), Max: 99.2 (01 Dec 2021 14:05)  HR: 60 (02 Dec 2021 06:56) (60 - 64)  BP: 112/70 (02 Dec 2021 06:56) (111/71 - 123/70)  BP(mean): --  RR: 17 (02 Dec 2021 06:56) (17 - 17)  SpO2: 98% (02 Dec 2021 06:56) (97% - 98%)    PHYSICAL EXAM:  GENERAL/CONSTITUTIONAL: NAD, Awake, AOx3 (person, place, time)  HEENT: NCAT  ---EYES: no scleral icterus, EOMI  ---ENMT: MMM, aphthous ulcerations on left, lateral tongue; mucinous discharge near surgical site w/o obvious purulence or odor  CARDIO: RRR.   RESP: Normal respiratory effort;  ABD: Soft, NTND;  MSK: No obvious deformity or ROM deficit.  ---EXTREMITIES: No peripheral edema. Peripheral pulses 2+ x4.  SKIN: Warm, dry. No rashes.   NEURO: Moves all four extremities spontaneously  PSYCH: Appropriate mood & affect.   LABS:                        13.8   6.20  )-----------( 251      ( 02 Dec 2021 07:24 )             42.3     12-01    135  |  100  |  12  ----------------------------<  103<H>  4.0   |  26  |  0.80    Ca    9.0      01 Dec 2021 08:02  Phos  2.9     12-01  Mg     2.20     12-01      PT/INR - ( 30 Nov 2021 08:10 )   PT: 12.2 sec;   INR: 1.07 ratio         PTT - ( 30 Nov 2021 08:10 )  PTT:32.4 sec          MICROBIOLOGY    Culture - Fungal, Other (collected 30 Nov 2021 14:48)  Source: .Other left mandibular abscess  Preliminary Report (01 Dec 2021 07:33):    Testing in progress    Culture - Abscess with Gram Stain (collected 30 Nov 2021 14:47)  Source: .Abscess left mandibular abscess  Preliminary Report (01 Dec 2021 09:17):    Rare Gram positive organisms      IMAGING  No interval imaging    COORDINATION OF CARE:  ---PCP:  ---Consultants: JOSSELYN VELÁSQUEZ (signed off)

## 2021-12-02 NOTE — PROGRESS NOTE ADULT - ASSESSMENT
52yo M with hx of hypothyroidism, HLD and prior mandibular implant presenting as transfer from [facility] for evaluation of facial swelling, imaging findings suspicious for mandibular abscess with extension into mandibular implant.    //L. Mandibular abscess with extension into mandibular implant  -S/p OR 11/30 with removal of mesh, unclear if entire prosthetic material was removed  -I&D cultures 11/27 showing normal mouth polo    asked micro to identify multiple OR organisms- appear to represent oral polo    Suggest  -F/U OR cultures- preliminary staph epi and others  -continue unasyn      Jenniffer Crow MD  Pager: [phone]  After 5 PM or weekends please call fellow on call or office [phone]

## 2021-12-02 NOTE — DISCHARGE NOTE PROVIDER - NSDCMRMEDTOKEN_GEN_ALL_CORE_FT
atorvastatin 10 mg oral tablet: 1 tab(s) orally once a day  levothyroxine 75 mcg (0.075 mg) oral tablet: 1 tab(s) orally once a day   amoxicillin-clavulanate 875 mg-125 mg oral tablet: 1 tab(s) orally every 12 hours  atorvastatin 10 mg oral tablet: 1 tab(s) orally once a day  chlorhexidine 0.12% mucous membrane liquid: 15 milliliter(s) mucous membrane 2 times a day   levothyroxine 75 mcg (0.075 mg) oral tablet: 1 tab(s) orally once a day

## 2021-12-02 NOTE — DISCHARGE NOTE PROVIDER - NSCORESITESY/N_GEN_A_CORE_RD
Progress Note    Patient: Manuel Alvarez Date: 5/30/2021   male, 61 year old  Admit Date: 5/27/2021   Attending: Melly Johnson MD      Subjective:  Manuel Alvarez is a 61 year old male who is being seen in follow up for Loculated pleural effusion     - no acute events, doing well, good output from chest tube, afebrile, no chills or sweats, chest wall pain controlled, no chest pain, oxygen requirement stable, no n/v, in good spirits    Medications: personally reviewed today in this patient's active orders section of epic  Allergies:   Allergies as of 05/27/2021 - Reviewed 05/27/2021   Allergen Reaction Noted   • Fenofibrate Other (See Comments)    • Niacin Other (See Comments)    • Simvastatin Other (See Comments)        PHYSICAL EXAM:  Visit Vitals  BP (!) 140/77 (BP Location: RUE - Right upper extremity, Patient Position: Sitting)   Pulse 94   Temp 97.8 °F (36.6 °C) (Oral)   Resp 16   Ht 5' 10\" (1.778 m)   Wt 112 kg   SpO2 95%   BMI 35.43 kg/m²     General: A & O X 3 in no acute distress, normal cephalic/atraumatic, Mood and Affect appropriate  Lungs: R lung with decreased breath sounds, R chest tube in place   Heart:  Regular rate and rhythm and S1, S2 present  Abdomen: Soft, NT/ND;  + B.S.; No guarding or rebound; No peritoneal signs  Extremities: cyanosis absent; no obvious lesions or wounds, no LE edema b/l  Neurologic:  Grossly intact as best as patient can cooperate with exam      Labs:  Recent Labs   Lab 05/30/21  0651 05/29/21  0603 05/28/21  0613   WBC 10.6 12.9* 14.3*   RBC 3.70* 3.66* 3.98*   HGB 10.6* 10.3* 11.5*   HCT 33.4* 33.0* 35.1*    305 315   SEG 77 77 79     Recent Labs   Lab 05/30/21  0651 05/29/21  0604 05/28/21  0613 05/27/21  1023   SODIUM 136 135 135 140   POTASSIUM 4.4 4.0 4.3 4.2   CHLORIDE 103 103 101 101   CO2 28 26 29 31   BUN 16 12 14 20   CREATININE 0.84 0.73 0.75 0.75   GLUCOSE 298* 275* 295* 272*   CALCIUM 7.7* 7.9* 8.7 9.5   ALBUMIN 1.8* 2.0*  --  2.6*   AST  No 14 16  --  21   GPT 17 21  --  21   BILIRUBIN 0.5 0.7  --  0.9   ALKPT 106 117  --  106   INR  --   --   --  1.1       Assessment & Plan:     · Sepsis from R lower lobe Community acquired pneumonia with R loculated parapneumonic pleural effusion, s/p R chest tube placement s/p tPA/Dornase on 5/29, 5/30 -   - transfer from [facility] for chest tube placement and pulmonary evaluation   - ESR >120-->>103, CRP 21.9-->>19.1, procalcitonin 1.08-->0.72, lactate 1.1, pleural culture ngt, chlamydia pending, coxiella pending, urine histo and blasto pending, urine legionella and strep pneumo neg  - pleural fluid cytology pending   - CT chest with No pulmonary embolism.  Right lung airspace disease representing a combination of atelectasis and right lower lobe pneumonia with loculated multicomponent right pleural effusion. Consider pneumonia with parapneumonic effusion. Additionally, there is stranding of the right side of pericardial fat and fat in the anterior mediastinum raising concern for mediastinitis. Correlate clinically for Pericarditis  - most recent CXR with Continued improved aeration at the right lung base with associated decrease in right pleural effusion  - pulmonary consulted, Dr. Herron, appreciate assistance  - s/p chest tube placement on 5/28 by IR, continue as per pulmonary  - abx: cefepime, azithromycin to continue, stop vanc as per ID recs  - ID consulted, Dr. Saba, appreciate assistance  - pt was hydrated in house  - duonebs, cough medications and pulmonary toilet  - hold DVT prophylaxis due to administration of tPA/Dornase as per pulmonary    · Acute hypoxemic respiratory failure - due to above, manage as above, wean off oxygen as able    · strep mitis/step oralis contamination -    - single blood cx with strep mitis/strep oralis, repeat blood cx ngtd  - ID consulted as above  - discontinued vanc as no indication    · Questionable mediastinitis with pericarditis   - ESR >120-->112, CRP 21.9-->20.6  - CT  chest with stranding of the right side of pericardial fat and fat in the anterior mediastinum raising concern for mediastinitis. Correlate clinically for pericarditis  - seen by cardiology, appreciate assistance, Dr. Frye  - echo with EF 50-65%, no WMAs, No significant amount of pericardial effusion. At the most, trivial amount posteriorly, possibly physiological.    · DM2 with obesity, on long term insulin use, polyneuropathy, vascular complications, hyperglycemia  - hgba1c: 8.5; hold home PO meds while in house, SSI, adjust as needed, accu checks TID; carb consistent diet, lifestyle modifications    · HTN essential, HLD, CAD - on lisinopril, metoprolol, asa    · Obesity, class 1  - BMI on admission 34, most likely due to excess calories, lifestyle modifications, weight loss recommended    · Obstructive sleep apnea - cpap as needed at night    · BPH unspecified - not on any medications prior to admission    · GERD unspeicified - PPI    · Leukocytosis - multifactorial etiology, continue current management and monitor    · Anemia, normocytic - acute, likely reactive from acute illness, hgb stable, no e/o bleeding, continue monitor and follow up with PCP as an outpatient    DVT Propylaxis- SCDs, TEDs, subcutaneous lovenox on hold due to administration of tPA/Dornase as per pulmonary    Central Line- none  Lainez Catheter- none  Chest tube in place    Fluids: none  Electrolytes: replete as needed  Nutrition: consistent carb, cardiac diet  Stress ulcer ppx: ppi    Guardian/POA: none  Living situation: from home    Code status: Full Resuscitation    Dispo:  Medical inpatient    -----------------------------------------------------------------------------------------------------------    Hospital Day #: Hospital Day: 4    Tentative discharge Disposition: To be determined likely 3-4 days        Melly Johnson MD  Hospitalist  5/30/2021  12:18 PM

## 2021-12-02 NOTE — DISCHARGE NOTE PROVIDER - PROVIDER TOKENS
PROVIDER:[TOKEN:[[id number]],FOLLOWUP:[1 week],ESTABLISHEDPATIENT:[T]],PROVIDER:[TOKEN:[[id number]],SCHEDULEDAPPT:[12/08/2021],SCHEDULEDAPPTTIME:[03:30 PM],ESTABLISHEDPATIENT:[T]]

## 2021-12-02 NOTE — PROGRESS NOTE ADULT - ASSESSMENT
Mr. Fulton is a 51M with h/o Hypothyroidism, HLD who is admitted for evaluation and management of a left-sided mandibular mass with imaging findings concerning for abscess (either odontologic or delayed foreign-body in origin) or non-infectious process (i.e. sarcoma vs other connective tissue mass) and who remains hemodynamically stable while receiving IV ABx, s/p surgical intervention 11/30/2021 with plan for optimization of ABx regimen after culture results.

## 2021-12-02 NOTE — DISCHARGE NOTE PROVIDER - NSDCCPTREATMENT_GEN_ALL_CORE_FT
PRINCIPAL PROCEDURE  Procedure: MR face w con  Findings and Treatment: IMPRESSION:  Redemonstration, 3.8 x 1.7 x 1.9 cm AP, TR, CC rim-enhancing central nonenhancing lesion adjacent to the left lateral mandibular body likely abscess, and superinfection  chin prosthesis, with r phlegmon, left submental inflammatory change, likely odontogenic origin. Redemonstration unerupted bilateral first mandibular molar teeth, irregular decreased T1 signal enhancement with corresponding lucency and periarticular lucency along left greater than right maxillary molar and premolar teeth involving the left retromolar trigone and left buccinator muscle with extension to the left parotid Stensen's duct insertion (30:7).

## 2021-12-02 NOTE — DISCHARGE NOTE PROVIDER - NSFOLLOWUPCLINICS_GEN_ALL_ED_FT
[facility]  Dental  [address] [place] [address]  Phone: [phone]  Fax:     [facility] [place]  Dermatology  [address], [address]  [place] [address]  Phone: [phone]  Fax: [phone]  Follow Up Time: 1 month    [facility] [place]  Dermatology  [address], [address]  [place] [address]  Phone: [phone]  Fax: [phone]  Follow Up Time: 1 month

## 2021-12-02 NOTE — DISCHARGE NOTE PROVIDER - CARE PROVIDER_API CALL
TRAMAINE HERRING  Internal Medicine  [address] [place] [address] [address]  [address]  [place] [address]  Phone: [phone]  Fax: [phone]  Established Patient  Follow Up Time: 1 week    Kaz Rousseau (DDS; MD)  OralMaxillofacial Surgery  [address] [place] [address]  Phone: [phone]  Fax: [phone]  Established Patient  Scheduled Appointment: 12/08/2021 03:30 PM

## 2021-12-03 ENCOUNTER — TRANSCRIPTION ENCOUNTER (OUTPATIENT)
Age: 51
End: 2021-12-03

## 2021-12-03 VITALS
DIASTOLIC BLOOD PRESSURE: 76 MMHG | SYSTOLIC BLOOD PRESSURE: 119 MMHG | OXYGEN SATURATION: 97 % | RESPIRATION RATE: 17 BRPM | TEMPERATURE: 99 F | HEART RATE: 66 BPM

## 2021-12-03 LAB
ANION GAP SERPL CALC-SCNC: 12 MMOL/L — SIGNIFICANT CHANGE UP (ref 7–14)
BASOPHILS # BLD AUTO: 0.02 K/UL — SIGNIFICANT CHANGE UP (ref 0–0.2)
BASOPHILS NFR BLD AUTO: 0.3 % — SIGNIFICANT CHANGE UP (ref 0–2)
BUN SERPL-MCNC: 14 MG/DL — SIGNIFICANT CHANGE UP (ref 7–23)
CALCIUM SERPL-MCNC: 9.1 MG/DL — SIGNIFICANT CHANGE UP (ref 8.4–10.5)
CHLORIDE SERPL-SCNC: 103 MMOL/L — SIGNIFICANT CHANGE UP (ref 98–107)
CO2 SERPL-SCNC: 24 MMOL/L — SIGNIFICANT CHANGE UP (ref 22–31)
CREAT SERPL-MCNC: 0.88 MG/DL — SIGNIFICANT CHANGE UP (ref 0.5–1.3)
EOSINOPHIL # BLD AUTO: 0.3 K/UL — SIGNIFICANT CHANGE UP (ref 0–0.5)
EOSINOPHIL NFR BLD AUTO: 4.1 % — SIGNIFICANT CHANGE UP (ref 0–6)
GLUCOSE SERPL-MCNC: 85 MG/DL — SIGNIFICANT CHANGE UP (ref 70–99)
HCT VFR BLD CALC: 40.8 % — SIGNIFICANT CHANGE UP (ref 39–50)
HGB BLD-MCNC: 14.3 G/DL — SIGNIFICANT CHANGE UP (ref 13–17)
IANC: 4.51 K/UL — SIGNIFICANT CHANGE UP (ref 1.5–8.5)
IMM GRANULOCYTES NFR BLD AUTO: 0.3 % — SIGNIFICANT CHANGE UP (ref 0–1.5)
LYMPHOCYTES # BLD AUTO: 1.75 K/UL — SIGNIFICANT CHANGE UP (ref 1–3.3)
LYMPHOCYTES # BLD AUTO: 24.2 % — SIGNIFICANT CHANGE UP (ref 13–44)
MAGNESIUM SERPL-MCNC: 2.2 MG/DL — SIGNIFICANT CHANGE UP (ref 1.6–2.6)
MCHC RBC-ENTMCNC: 31 PG — SIGNIFICANT CHANGE UP (ref 27–34)
MCHC RBC-ENTMCNC: 35 GM/DL — SIGNIFICANT CHANGE UP (ref 32–36)
MCV RBC AUTO: 88.3 FL — SIGNIFICANT CHANGE UP (ref 80–100)
MONOCYTES # BLD AUTO: 0.64 K/UL — SIGNIFICANT CHANGE UP (ref 0–0.9)
MONOCYTES NFR BLD AUTO: 8.8 % — SIGNIFICANT CHANGE UP (ref 2–14)
NEUTROPHILS # BLD AUTO: 4.51 K/UL — SIGNIFICANT CHANGE UP (ref 1.8–7.4)
NEUTROPHILS NFR BLD AUTO: 62.3 % — SIGNIFICANT CHANGE UP (ref 43–77)
NRBC # BLD: 0 /100 WBCS — SIGNIFICANT CHANGE UP
NRBC # FLD: 0 K/UL — SIGNIFICANT CHANGE UP
PHOSPHATE SERPL-MCNC: 3.7 MG/DL — SIGNIFICANT CHANGE UP (ref 2.5–4.5)
PLATELET # BLD AUTO: 263 K/UL — SIGNIFICANT CHANGE UP (ref 150–400)
POTASSIUM SERPL-MCNC: 4 MMOL/L — SIGNIFICANT CHANGE UP (ref 3.5–5.3)
POTASSIUM SERPL-SCNC: 4 MMOL/L — SIGNIFICANT CHANGE UP (ref 3.5–5.3)
RBC # BLD: 4.62 M/UL — SIGNIFICANT CHANGE UP (ref 4.2–5.8)
RBC # FLD: 11.9 % — SIGNIFICANT CHANGE UP (ref 10.3–14.5)
SODIUM SERPL-SCNC: 139 MMOL/L — SIGNIFICANT CHANGE UP (ref 135–145)
WBC # BLD: 7.24 K/UL — SIGNIFICANT CHANGE UP (ref 3.8–10.5)
WBC # FLD AUTO: 7.24 K/UL — SIGNIFICANT CHANGE UP (ref 3.8–10.5)

## 2021-12-03 PROCEDURE — 99239 HOSP IP/OBS DSCHRG MGMT >30: CPT | Mod: GC

## 2021-12-03 PROCEDURE — 99232 SBSQ HOSP IP/OBS MODERATE 35: CPT

## 2021-12-03 RX ORDER — CHLORHEXIDINE GLUCONATE 213 G/1000ML
15 SOLUTION TOPICAL
Qty: 210 | Refills: 0
Start: 2021-12-03 | End: 2021-12-09

## 2021-12-03 RX ORDER — IBUPROFEN 200 MG
400 TABLET ORAL EVERY 8 HOURS
Refills: 0 | Status: DISCONTINUED | OUTPATIENT
Start: 2021-12-03 | End: 2021-12-03

## 2021-12-03 RX ADMIN — Medication 1 TABLET(S): at 11:14

## 2021-12-03 RX ADMIN — ENOXAPARIN SODIUM 40 MILLIGRAM(S): 100 INJECTION SUBCUTANEOUS at 11:03

## 2021-12-03 RX ADMIN — AMPICILLIN SODIUM AND SULBACTAM SODIUM 200 GRAM(S): 250; 125 INJECTION, POWDER, FOR SUSPENSION INTRAMUSCULAR; INTRAVENOUS at 11:00

## 2021-12-03 RX ADMIN — Medication 75 MICROGRAM(S): at 05:26

## 2021-12-03 RX ADMIN — AMPICILLIN SODIUM AND SULBACTAM SODIUM 200 GRAM(S): 250; 125 INJECTION, POWDER, FOR SUSPENSION INTRAMUSCULAR; INTRAVENOUS at 04:08

## 2021-12-03 RX ADMIN — CHLORHEXIDINE GLUCONATE 15 MILLILITER(S): 213 SOLUTION TOPICAL at 05:26

## 2021-12-03 RX ADMIN — CHLORHEXIDINE GLUCONATE 15 MILLILITER(S): 213 SOLUTION TOPICAL at 18:19

## 2021-12-03 RX ADMIN — Medication 1 TABLET(S): at 18:18

## 2021-12-03 NOTE — DISCHARGE NOTE NURSING/CASE MANAGEMENT/SOCIAL WORK - NSDCPEFALRISK_GEN_ALL_CORE
For information on Fall & Injury Prevention, visit: [email or web address] OR  [email or web address] OR  https://www.cdc.gov/steadi/patient.html

## 2021-12-03 NOTE — PROGRESS NOTE ADULT - PROVIDER SPECIALTY LIST ADULT
Infectious Disease
Anticoag Management
Dental
Dental
OMFS
Infectious Disease
Infectious Disease
Internal Medicine
OMFS
Internal Medicine

## 2021-12-03 NOTE — PROGRESS NOTE ADULT - ASSESSMENT
50yo M with hx of hypothyroidism, HLD and prior mandibular implant presenting as transfer from [facility] for evaluation of facial swelling, imaging findings suspicious for mandibular abscess with extension into mandibular implant.    //L. Mandibular abscess with extension into mandibular implant  -S/p OR 11/30 with removal of mesh, unclear if entire prosthetic material was removed  -I&D cultures 11/27 showing normal mouth polo  much improved    asked micro to identify multiple OR organisms- appear to represent oral polo  staph epi is sensitive to augmentin    Suggest    -when ready to d/c home can transition to augmentin 875 mg po q 12 h ---> 12/10      Jenniffer Crow MD  Pager: [phone]  After 5 PM or weekends please call fellow on call or office [phone]

## 2021-12-03 NOTE — DISCHARGE NOTE NURSING/CASE MANAGEMENT/SOCIAL WORK - PATIENT PORTAL LINK FT
You can access the FollowMyHealth Patient Portal offered by [facility] by registering at the following website: [email or web address]. By joining [facility]’s FollowMyHealth portal, you will also be able to view your health information using other applications (apps) compatible with our system.

## 2021-12-03 NOTE — PROGRESS NOTE ADULT - SUBJECTIVE AND OBJECTIVE BOX
Charlie Yun M.D.  [facility] PGY-1    PROGRESS NOTE:     Patient is a 51y old  Male who presents with a chief complaint of Jaw Swelling (02 Dec 2021 16:54)      -OVERNIGHT EVENTS-      -SUBJECTIVE-      MEDICATIONS  (STANDING):  ampicillin/sulbactam  IVPB 3 Gram(s) IV Intermittent every 6 hours  atorvastatin 10 milliGRAM(s) Oral at bedtime  chlorhexidine 0.12% Liquid 15 milliLiter(s) Swish and Spit two times a day  enoxaparin Injectable 40 milliGRAM(s) SubCutaneous daily  lactobacillus acidophilus 1 Tablet(s) Oral daily  levothyroxine 75 MICROGram(s) Oral daily    MEDICATIONS  (PRN):  acetaminophen     Tablet .. 650 milliGRAM(s) Oral every 6 hours PRN Mild Pain (1 - 3)  ketorolac   Injectable 15 milliGRAM(s) IV Push every 6 hours PRN Moderate Pain (4 - 6)  oxyCODONE    IR 5 milliGRAM(s) Oral every 6 hours PRN Severe Pain (7 - 10)      -OBJECTIVE-    CAPILLARY BLOOD GLUCOSE        I&O's Summary    02 Dec 2021 07:01  -  03 Dec 2021 07:00  --------------------------------------------------------  IN: 800 mL / OUT: 0 mL / NET: 800 mL        VITAL SIGNS  Vital Signs Last 24 Hrs  T(C): 36.6 (03 Dec 2021 05:28), Max: 37.2 (02 Dec 2021 22:04)  T(F): 97.9 (03 Dec 2021 05:28), Max: 99 (02 Dec 2021 22:04)  HR: 60 (03 Dec 2021 05:28) (60 - 63)  BP: 119/62 (03 Dec 2021 05:28) (119/62 - 130/78)  BP(mean): --  RR: 16 (03 Dec 2021 05:28) (14 - 17)  SpO2: 98% (03 Dec 2021 05:28) (98% - 100%)    PHYSICAL EXAM:  GENERAL/CONSTITUTIONAL: NAD, Awake, AOx3 (person, place, time)  HEENT: NCAT  ---EYES: no scleral icterus, EOMI, PERRLA  ---ENMT: MMM,   ---NECK: No palpable masses; no thyromegaly  CARDIO: RRR. Normal S1/S2, no m/r/g.  RESP: Normal respiratory effort; CTAB, no w/r/r  ABD: Soft, NTND; +BS.   : No CVAT.   MSK: No obvious deformity or ROM deficit.  ---EXTREMITIES: No peripheral edema. Peripheral pulses 2+ x4.  SKIN: Warm, dry. No rashes.   NEURO: Moves all four extremities spontaneously  PSYCH: Appropriate mood & affect. No VH/AH. No SI/HI.    LABS:                        13.8   6.20  )-----------( 251      ( 02 Dec 2021 07:24 )             42.3     12-02    140  |  105  |  14  ----------------------------<  91  4.2   |  26  |  0.87    Ca    9.1      02 Dec 2021 07:24  Phos  3.7     12-02  Mg     2.30     12-02                MICROBIOLOGY    Culture - Fungal, Other (collected 30 Nov 2021 14:48)  Source: .Other left mandibular abscess  Preliminary Report (01 Dec 2021 07:33):    Testing in progress    Culture - Abscess with Gram Stain (collected 30 Nov 2021 09:30)  Source: .Abscess left mandibular abscess  Final Report (02 Dec 2021 12:19):    Culture yields growth of greater than 3 colony types of    Call client services within 7 days if further workup is clinically    indicated. Culture includes    Rare Staphylococcus epidermidis  Organism: Staphylococcus epidermidis (02 Dec 2021 12:19)  Organism: Staphylococcus epidermidis (02 Dec 2021 12:19)        IMAGING        COORDINATION OF CARE:  ---PCP:  ---Consultants:   Charlie Yun M.D.  [facility] PGY-1    PROGRESS NOTE:     Patient is a 51y old  Male who presents with a chief complaint of Jaw Swelling (02 Dec 2021 16:54)      -OVERNIGHT EVENTS-  NAEON    -SUBJECTIVE-   #[id number]  Mr. Fulton says that he has no pain this morning. He is curious about the long-term plan. His boss is curious about what he is being managed for, but Mr. Fulton declines requiring a note for work.    MEDICATIONS  (STANDING):  ampicillin/sulbactam  IVPB 3 Gram(s) IV Intermittent every 6 hours  atorvastatin 10 milliGRAM(s) Oral at bedtime  chlorhexidine 0.12% Liquid 15 milliLiter(s) Swish and Spit two times a day  enoxaparin Injectable 40 milliGRAM(s) SubCutaneous daily  lactobacillus acidophilus 1 Tablet(s) Oral daily  levothyroxine 75 MICROGram(s) Oral daily    MEDICATIONS  (PRN):  acetaminophen     Tablet .. 650 milliGRAM(s) Oral every 6 hours PRN Mild Pain (1 - 3)  ketorolac   Injectable 15 milliGRAM(s) IV Push every 6 hours PRN Moderate Pain (4 - 6)  oxyCODONE    IR 5 milliGRAM(s) Oral every 6 hours PRN Severe Pain (7 - 10)      -OBJECTIVE-    CAPILLARY BLOOD GLUCOSE        I&O's Summary    02 Dec 2021 07:01  -  03 Dec 2021 07:00  --------------------------------------------------------  IN: 800 mL / OUT: 0 mL / NET: 800 mL        VITAL SIGNS  Vital Signs Last 24 Hrs  T(C): 36.6 (03 Dec 2021 05:28), Max: 37.2 (02 Dec 2021 22:04)  T(F): 97.9 (03 Dec 2021 05:28), Max: 99 (02 Dec 2021 22:04)  HR: 60 (03 Dec 2021 05:28) (60 - 63)  BP: 119/62 (03 Dec 2021 05:28) (119/62 - 130/78)  BP(mean): --  RR: 16 (03 Dec 2021 05:28) (14 - 17)  SpO2: 98% (03 Dec 2021 05:28) (98% - 100%)    PHYSICAL EXAM:  GENERAL/CONSTITUTIONAL: NAD, Awake, AOx3 (person, place, time)  HEENT: NCAT  ---EYES: no scleral icterus, EOMI, PERRLA  ---ENMT: MMM, continues to exhibit non-purulent oral secretions, no active hemorrhage  CARDIO: RRR. Normal S1/S2, no m/r/g.  RESP: Normal respiratory effort; CTAB, no w/r/r  ABD: Soft, NTND; +BS.   MSK: No obvious deformity or ROM deficit.  ---EXTREMITIES: No peripheral edema. Peripheral pulses 2+ x4.  SKIN: Warm, dry. No rashes.   NEURO: Moves all four extremities spontaneously  PSYCH: Appropriate mood & affect.    LABS:                        13.8   6.20  )-----------( 251      ( 02 Dec 2021 07:24 )             42.3     12-02    140  |  105  |  14  ----------------------------<  91  4.2   |  26  |  0.87    Ca    9.1      02 Dec 2021 07:24  Phos  3.7     12-02  Mg     2.30     12-02    MICROBIOLOGY    Culture - Fungal, Other (collected 30 Nov 2021 14:48)  Source: .Other left mandibular abscess  Preliminary Report (01 Dec 2021 07:33):    Testing in progress    Culture - Abscess with Gram Stain (collected 30 Nov 2021 09:30)  Source: .Abscess left mandibular abscess  Final Report (02 Dec 2021 12:19):    Culture yields growth of greater than 3 colony types of    Call client services within 7 days if further workup is clinically    indicated. Culture includes    Rare Staphylococcus epidermidis  Organism: Staphylococcus epidermidis (02 Dec 2021 12:19)  Organism: Staphylococcus epidermidis (02 Dec 2021 12:19)    IMAGING  No interval imagnig      COORDINATION OF CARE:  ---PCP: Taisha Godoy  ---Consultants: ID, OMFS

## 2021-12-03 NOTE — PROGRESS NOTE ADULT - SUBJECTIVE AND OBJECTIVE BOX
Follow Up:  mandible infection, s/p removal of infected chin prosthesis    Interval History/ROS:  feels much better.  planning d/c home soon    Allergies  No Known Allergies        ANTIMICROBIALS:  ampicillin/sulbactam  IVPB 3 every 6 hours        OTHER MEDS:  acetaminophen     Tablet .. 650 milliGRAM(s) Oral every 6 hours PRN  atorvastatin 10 milliGRAM(s) Oral at bedtime  chlorhexidine 0.12% Liquid 15 milliLiter(s) Swish and Spit two times a day  ketorolac   Injectable 15 milliGRAM(s) IV Push every 6 hours PRN  levothyroxine 75 MICROGram(s) Oral daily  oxyCODONE    IR 5 milliGRAM(s) Oral every 6 hours PRN    Vital Signs Last 24 Hrs  T(F): 97.9 (12-03-21 @ 05:28), Max: 99 (12-02-21 @ 22:04)  HR: 60 (12-03-21 @ 05:28)  BP: 119/62 (12-03-21 @ 05:28)  RR: 16 (12-03-21 @ 05:28)  SpO2: 98% (12-03-21 @ 05:28) (98% - 100%)  PHYSICAL EXAM:  Constitutional: Not in acute distress  Eyes: No icterus.  Oral cavity: minimal swelling left mandible  Neck: Supple  RS: Chest clear to auscultation bilaterally. No wheeze/rhonchi/crepitations.  CVS: S1, S2 heard. Regular rate and rhythm. No murmurs/rubs/gallops.  Abdomen: Soft. No guarding/rigidity/tenderness.  Extremities: Warm. No pedal edema  Skin: No lesions noted  Vascular: No evidence of phlebitis  Neuro: Alert, oriented to time/place/person  Cranial nerves 2-12 grossly normal. No focal abnormalities                                     14.3   7.24  )-----------( 263      ( 03 Dec 2021 07:44 )             40.8 12-03    139  |  103  |  14  ----------------------------<  85  4.0   |  24  |  0.88  Ca    9.1      03 Dec 2021 07:44Phos  3.7     12-03Mg     2.20     12-03                MICROBIOLOGY:  Vancomycin Level, Trough: 7.9 ug/mL (12-01-21 @ 05:19)  v  .Smear left mandibular  11-30-21   Testing in progress  --    No polymorphonuclear leukocytes per low power field  Moderate Gram positive cocci in pairs per oil power field  Few Gram Positive Rods per oil power field      .Abscess left mandibular abscess  11-30-21   Rare Gram positive organisms  --  --      .Other mouth  11-27-21   Testing in progress  --  --      .Other Other, Oral  11-27-21   Culture is being performed.  --  --      .Abscess Mouth  11-27-21   Normal mouth polo isolated  --  --          Rapid RVP Result: Mehrantekristan (11-27 @ 02:25)        RADIOLOGY:

## 2021-12-03 NOTE — PROGRESS NOTE ADULT - PROBLEM SELECTOR PLAN 3
- C/W Home Atorvastatin 10mg PO QHS

## 2021-12-03 NOTE — PROGRESS NOTE ADULT - PROBLEM SELECTOR PROBLEM 1
English
Swelling of mandible

## 2021-12-03 NOTE — PROGRESS NOTE ADULT - PROBLEM SELECTOR PLAN 5
Hospital Bundle  Fluids: PO ad margareth  Electrolytes: Replete K > 4, Mg > 2, Phos > 3  Nutrition: Diet DASH  PPX  ---VTE: SCDs, ambulate  ---GI: N/A  ---Resp: N/A  Access: PIV  Code Status: FULL CODE  Dispo: Pending surgical management, likely to home when cleared from ID perspective Hospital Bundle  Fluids: PO ad margareth  Electrolytes: Replete K > 4, Mg > 2, Phos > 3  Nutrition: Diet DASH  PPX  ---VTE: SCDs, ambulate  ---GI: N/A  ---Resp: N/A  Access: PIV  Code Status: FULL CODE  Dispo: Home

## 2021-12-03 NOTE — PROGRESS NOTE ADULT - PROBLEM SELECTOR PLAN 1
Thorough documentation of subjective included above. DDx includes infectious vs non-infectious swelling, including or excluding the implant he's reported to have had placed. Cultures of reported aspirate have been sent (though documentation of who collect or where the sample came from is as yet unclear). Now with GPRs. Unclear if still normal polo or a more indolent pathogen, will await speciation for ongoing management.  Diagnostics  - Plastics C/S, Appreciate Recs (signed off)  - OMFS/Dental C/S s/p I&D & operation; plan for tooth care as OP with dental  [x] MR Facial Bones c Contrast: c/f ondotogenic origin, communicated with plastics attg who defers to OMFS; OMFS will evaluate    Therapeutics  - Pain Control: APAP 650mg PO Q6H PRN, Toradol 15mg IV PRN  - C/W Ampicillin/Sulbactam ("Unasyn") 3g IV Q6H (11/27-)  - C/W Vancomycin 1000mg IV Q12H (11/27-) (Vanc Trough After 4th Dose if still necessary) Thorough documentation of subjective included above. DDx includes infectious vs non-infectious swelling, including or excluding the implant he's reported to have had placed. Cultures of reported aspirate have been sent (though documentation of who collect or where the sample came from is as yet unclear). Now with GPRs. Unclear if still normal polo or a more indolent pathogen, will await speciation for ongoing management.  Diagnostics  - Plastics C/S, Appreciate Recs (signed off)  - OMFS/Dental C/S s/p I&D & operation; plan for tooth care as OP with dental  [x] MR Facial Bones c Contrast: c/f ondotogenic origin, communicated with plastics attg who defers to OMFS; OMFS will evaluate    Therapeutics  - Pain Control: APAP 650mg PO Q6H PRN, Toradol 15mg IV PRN  - C/W Ampicillin/Sulbactam ("Unasyn") 3g IV Q6H (11/27-)  - D/C Vancomycin IV Q12H (11/27-12/1)

## 2021-12-03 NOTE — PROGRESS NOTE ADULT - PROBLEM SELECTOR PROBLEM 4
Hypopigmented skin lesion

## 2021-12-03 NOTE — PROGRESS NOTE ADULT - REASON FOR ADMISSION
Jaw Swelling
"facial Swelling"
Jaw Swelling

## 2021-12-03 NOTE — PROGRESS NOTE ADULT - ATTENDING COMMENTS
50 yo M with HLD and hypothyroidism transferred from [facility] for left mandibular swelling c/f infection/abscess. MRI facial bones notable for 3.8 x 1.7 x 1.9 cm AP, TR, CC rim-enhancing central nonenhancing lesion adjacent to the left lateral mandibular body likely abscess, and superinfection chin prosthesis, with r phlegmon, left submental inflammatory change, likely odontogenic origin. S/p incision, dissection and drain placement by dental. S/p implant removal by OMFS, culture obtained and foreign body sent to lab, f/u results. Pain improving. Appreciate ID consult, continuing unasyn/vanco until cultures result. Remainder as above.
52 yo M with HLD and hypothyroidism transferred from [facility] for left mandibular swelling c/f infection/abscess. Evaluated by dental and plastic surgery here. MRI facial bones notable for 3.8 x 1.7 x 1.9 cm AP, TR, CC rim-enhancing central nonenhancing lesion adjacent to the left lateral mandibular body likely abscess, and superinfection chin prosthesis, with r phlegmon, left submental inflammatory change, likely odontogenic origin. S/p incision, dissection and drain placement by dental. OMFS consulted, now s/p OR this AM for implant removal, culture obtained and foreign body sent to lab, f/u results. Appreciate ID consult, continuing unasyn/vanco until cultures result. Remainder as above.
52 yo M with HLD and hypothyroidism transferred from [facility] for left mandibular swelling c/f infection/abscess. MRI facial bones notable for 3.8 x 1.7 x 1.9 cm AP, TR, CC rim-enhancing central nonenhancing lesion adjacent to the left lateral mandibular body likely abscess, and superinfection chin prosthesis, with r phlegmon, left submental inflammatory change, likely odontogenic origin. S/p incision, dissection and drain placement by dental. S/p implant/mesh removal by OMFS, culture with staph epi sens to augmentin/bactrim. Patient comfortable, no significant pain, afebrile, no leukocytosis. Appreciate ID consult, continues on unasyn, f/u additional recs. Remainder as above.
50 yo M with HLD and hypothyroidism transferred from [facility] for left mandibular swelling c/f infection/abscess. Evaluated by dental and plastic surgery here. MRI facial bones notable for 3.8 x 1.7 x 1.9 cm AP, TR, CC rim-enhancing central nonenhancing lesion adjacent to the left lateral mandibular body likely abscess, and superinfection chin prosthesis, with r phlegmon, left submental inflammatory change, likely odontogenic origin. S/p incision, dissection and drain placement by dental. Plastic surgery recommending OMFS evaluation for concern for prosthesis infection, will follow up OMFS recs. Continues on abx with unasyn/vanco. Remainder as above.
52 yo M with HLD and hypothyroidism transferred from [facility] for left mandibular swelling c/f infection/abscess. Evaluated by dental and plastic surgery here. Afebrile overnight. No significant pain.    MRI facial bones w/ IV contrast this AM with Redemonstration, 3.8 x 1.7 x 1.9 cm AP, TR, CC rim-enhancing central nonenhancing lesion adjacent to the left lateral mandibular body likely abscess, and superinfection  chin prosthesis, with r phlegmon, left submental inflammatory change, likely odontogenic origin. Redemonstration unerupted bilateral first mandibular molar teeth, irregular decreased T1 signal enhancement with corresponding lucency and periarticular lucency along left greater than right maxillary molar and premolar teeth involving the left retromolar trigone and left buccinator muscle with extension to the left parotid Stensen's duct insertion    Will follow up Dental and Plastic surgery recs. Continue on IV abx with unasyn/vanco. Remainder as above.
52 yo M with HLD and hypothyroidism transferred from [facility] for left mandibular swelling c/f infection/abscess. MRI facial bones notable for 3.8 x 1.7 x 1.9 cm AP, TR, CC rim-enhancing central nonenhancing lesion adjacent to the left lateral mandibular body likely abscess, and superinfection chin prosthesis, with r phlegmon, left submental inflammatory change, likely odontogenic origin. S/p incision, dissection and drain placement by dental. S/p implant removal by OMFS, culture obtained and foreign body sent to lab, f/u results. Comfortable, no significant pain. Appreciate ID consult, continuing unasyn/vanco until cultures result. Remainder as above.

## 2021-12-06 LAB — SURGICAL PATHOLOGY STUDY: SIGNIFICANT CHANGE UP

## 2021-12-29 LAB
CULTURE RESULTS: SIGNIFICANT CHANGE UP
SPECIMEN SOURCE: SIGNIFICANT CHANGE UP

## 2022-01-19 LAB
CULTURE RESULTS: SIGNIFICANT CHANGE UP
SPECIMEN SOURCE: SIGNIFICANT CHANGE UP
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
## 2024-01-10 PROBLEM — E78.5 HYPERLIPIDEMIA, UNSPECIFIED: Chronic | Status: ACTIVE | Noted: 2021-11-26

## 2024-01-10 PROBLEM — E03.9 HYPOTHYROIDISM, UNSPECIFIED: Chronic | Status: ACTIVE | Noted: 2021-11-26

## 2024-01-11 ENCOUNTER — APPOINTMENT (OUTPATIENT)
Dept: OPHTHALMOLOGY | Facility: CLINIC | Age: 54
End: 2024-01-11

## 2024-01-11 ENCOUNTER — RX ONLY (RX ONLY)
Age: 54
End: 2024-01-11

## 2024-01-11 ENCOUNTER — OFFICE (OUTPATIENT)
Dept: URBAN - METROPOLITAN AREA CLINIC 38 | Facility: CLINIC | Age: 54
Setting detail: OPHTHALMOLOGY
End: 2024-01-11
Payer: COMMERCIAL

## 2024-01-11 DIAGNOSIS — T15.02XA: ICD-10-CM

## 2024-01-11 PROBLEM — S05.02XA CORNEAL ABRASION; INITIAL ENCOUNTER: Status: ACTIVE | Noted: 2024-01-11

## 2024-01-11 PROCEDURE — 65222 REMOVE FOREIGN BODY FROM EYE: CPT | Mod: LT | Performed by: OPHTHALMOLOGY

## 2024-01-11 ASSESSMENT — CORNEAL TRAUMA - ABRASION: OS_ABRASION: PRESENT

## 2024-01-11 ASSESSMENT — CONFRONTATIONAL VISUAL FIELD TEST (CVF)
OD_FINDINGS: FULL
OS_FINDINGS: FULL

## 2024-01-11 ASSESSMENT — CORNEAL TRAUMA - FOREIGN BODY: OS_FOREIGNBODY: METALLIC W/RUST RING PRESENT

## 2024-10-01 NOTE — PATIENT PROFILE ADULT - BRAND OF COVID-19 VACCINATION
Continue Regimen: Zoryve cream as needed Moderna dose 1 and 2 Action 1: Continue Detail Level: Zone Otc Regimen: Recommended musely hair removal cream